# Patient Record
Sex: MALE | Race: WHITE | HISPANIC OR LATINO
[De-identification: names, ages, dates, MRNs, and addresses within clinical notes are randomized per-mention and may not be internally consistent; named-entity substitution may affect disease eponyms.]

---

## 2024-02-05 ENCOUNTER — APPOINTMENT (OUTPATIENT)
Dept: SURGERY | Facility: CLINIC | Age: 44
End: 2024-02-05
Payer: COMMERCIAL

## 2024-02-05 VITALS
SYSTOLIC BLOOD PRESSURE: 144 MMHG | BODY MASS INDEX: 29.8 KG/M2 | WEIGHT: 220 LBS | HEIGHT: 72 IN | DIASTOLIC BLOOD PRESSURE: 85 MMHG | HEART RATE: 85 BPM | OXYGEN SATURATION: 99 %

## 2024-02-05 DIAGNOSIS — Z78.9 OTHER SPECIFIED HEALTH STATUS: ICD-10-CM

## 2024-02-05 DIAGNOSIS — Z80.0 FAMILY HISTORY OF MALIGNANT NEOPLASM OF DIGESTIVE ORGANS: ICD-10-CM

## 2024-02-05 PROBLEM — Z00.00 ENCOUNTER FOR PREVENTIVE HEALTH EXAMINATION: Status: ACTIVE | Noted: 2024-02-05

## 2024-02-05 PROCEDURE — 99204 OFFICE O/P NEW MOD 45 MIN: CPT

## 2024-02-05 RX ORDER — LEVOTHYROXINE SODIUM 0.03 MG/1
25 TABLET ORAL
Refills: 0 | Status: ACTIVE | COMMUNITY

## 2024-02-05 NOTE — PLAN
[FreeTextEntry1] : Mr. LIGHT  was told significance of findings, options, risks and benefits were explained.  Informed consent for laparoscopic/possible open  cholecystectomy  and potential risks, benefits and alternatives (surgical options were discussed including non-surgical options or the option of no surgery) to the planned surgery were discussed in depth.  All surgical options were discussed including non-surgical treatments.  He wishes to proceed with surgery.  We will plan for surgery on at the next available date, pending any required insurance pre-certification or pre-approval. He agrees to obtain any necessary pre-operative evaluations and testing prior to surgery. Patient instructed to maintain a fat-free diet, and to seek immediate medical attention with any acute change or worsening of symptoms, including but not limited to abdominal pain, fever, chills, nausea, vomiting, or yellowing of the skin.  Patient advised to seek immediate medical attention with any acute change in symptoms or with the development of any new or worsening symptoms.  Patient's questions and concerns addressed to patient's satisfaction, and patient verbalized an understanding of the information discussed.

## 2024-02-05 NOTE — CONSULT LETTER
[Dear  ___] : Dear  [unfilled], [Consult Letter:] : I had the pleasure of evaluating your patient, [unfilled]. [Please see my note below.] : Please see my note below. [Consult Closing:] : Thank you very much for allowing me to participate in the care of this patient.  If you have any questions, please do not hesitate to contact me. [Sincerely,] : Sincerely, [FreeTextEntry3] : Catracho Loya MD, FACS

## 2024-02-05 NOTE — PHYSICAL EXAM
[Abdominal Masses] : No abdominal masses [Abdomen Tenderness] : ~T ~M No abdominal tenderness [Alert] : alert [Oriented to Person] : oriented to person [Oriented to Place] : oriented to place [Oriented to Time] : oriented to time [Calm] : calm [de-identified] : He  is alert, well-groomed, and in NAD   [de-identified] : anicteric.  Nasal mucosa pink, septum midline. Oral mucosa pink.  Tongue midline, Pharynx without exudates.   [de-identified] : Neck supple. Trachea midline. Thyroid isthmus barely palpable, lobes not felt.   [de-identified] : no hernia

## 2024-02-05 NOTE — HISTORY OF PRESENT ILLNESS
[de-identified] : Mr. JOSE ENRIQUE LIGHT is a 43 year  old patient who was referred by Dr. Kyle Loya with the chief complaint of having right upper quadrant and epigastric pain on and off   since January.    He reports admission to [facility] for severe abdominal pain nausea and vomiting  on  01/05/2024  . He was admitted  for 8 days for cholecystitis and discharged to home on PO Augmentin.   LFT and AST were elevated.  WBC were 12.4.  Lipase was normal.  Records were reviewed using patient portal using a patient's mobile device.US abdomen from 01/05/2024  showed 5.8 mm GB wall and multiple GB stones. Positive Shah sign. since his discharge  he had  no nausea or vomiting and no history of jaundice, acholia or acholuria.   Appetite is good and weight is stable.   He   has no family history of biliary tract disease.  
2 seconds or less

## 2024-02-07 ENCOUNTER — OUTPATIENT (OUTPATIENT)
Dept: OUTPATIENT SERVICES | Facility: HOSPITAL | Age: 44
LOS: 1 days | End: 2024-02-07
Payer: COMMERCIAL

## 2024-02-07 VITALS
TEMPERATURE: 98 F | HEIGHT: 72 IN | HEART RATE: 82 BPM | SYSTOLIC BLOOD PRESSURE: 122 MMHG | DIASTOLIC BLOOD PRESSURE: 82 MMHG | WEIGHT: 220.46 LBS | OXYGEN SATURATION: 98 % | RESPIRATION RATE: 18 BRPM

## 2024-02-07 DIAGNOSIS — K81.9 CHOLECYSTITIS, UNSPECIFIED: ICD-10-CM

## 2024-02-07 DIAGNOSIS — Z01.818 ENCOUNTER FOR OTHER PREPROCEDURAL EXAMINATION: ICD-10-CM

## 2024-02-07 DIAGNOSIS — E03.9 HYPOTHYROIDISM, UNSPECIFIED: ICD-10-CM

## 2024-02-07 DIAGNOSIS — Z98.890 OTHER SPECIFIED POSTPROCEDURAL STATES: Chronic | ICD-10-CM

## 2024-02-07 LAB
ALBUMIN SERPL ELPH-MCNC: 3.6 G/DL — SIGNIFICANT CHANGE UP (ref 3.5–5)
ALP SERPL-CCNC: 56 U/L — SIGNIFICANT CHANGE UP (ref 40–120)
ALT FLD-CCNC: 31 U/L DA — SIGNIFICANT CHANGE UP (ref 10–60)
ANION GAP SERPL CALC-SCNC: 5 MMOL/L — SIGNIFICANT CHANGE UP (ref 5–17)
APTT BLD: 33.5 SEC — SIGNIFICANT CHANGE UP (ref 24.5–35.6)
AST SERPL-CCNC: 24 U/L — SIGNIFICANT CHANGE UP (ref 10–40)
BILIRUB SERPL-MCNC: 0.3 MG/DL — SIGNIFICANT CHANGE UP (ref 0.2–1.2)
BLD GP AB SCN SERPL QL: SIGNIFICANT CHANGE UP
BUN SERPL-MCNC: 13 MG/DL — SIGNIFICANT CHANGE UP (ref 7–18)
CALCIUM SERPL-MCNC: 9.1 MG/DL — SIGNIFICANT CHANGE UP (ref 8.4–10.5)
CHLORIDE SERPL-SCNC: 104 MMOL/L — SIGNIFICANT CHANGE UP (ref 96–108)
CO2 SERPL-SCNC: 28 MMOL/L — SIGNIFICANT CHANGE UP (ref 22–31)
CREAT SERPL-MCNC: 1.03 MG/DL — SIGNIFICANT CHANGE UP (ref 0.5–1.3)
EGFR: 91 ML/MIN/1.73M2 — SIGNIFICANT CHANGE UP
GLUCOSE SERPL-MCNC: 96 MG/DL — SIGNIFICANT CHANGE UP (ref 70–99)
HCT VFR BLD CALC: 41.6 % — SIGNIFICANT CHANGE UP (ref 39–50)
HGB BLD-MCNC: 13.9 G/DL — SIGNIFICANT CHANGE UP (ref 13–17)
INR BLD: 1 RATIO — SIGNIFICANT CHANGE UP (ref 0.85–1.18)
MCHC RBC-ENTMCNC: 28.4 PG — SIGNIFICANT CHANGE UP (ref 27–34)
MCHC RBC-ENTMCNC: 33.4 GM/DL — SIGNIFICANT CHANGE UP (ref 32–36)
MCV RBC AUTO: 85.1 FL — SIGNIFICANT CHANGE UP (ref 80–100)
NRBC # BLD: 0 /100 WBCS — SIGNIFICANT CHANGE UP (ref 0–0)
PLATELET # BLD AUTO: 295 K/UL — SIGNIFICANT CHANGE UP (ref 150–400)
POTASSIUM SERPL-MCNC: 4.2 MMOL/L — SIGNIFICANT CHANGE UP (ref 3.5–5.3)
POTASSIUM SERPL-SCNC: 4.2 MMOL/L — SIGNIFICANT CHANGE UP (ref 3.5–5.3)
PROT SERPL-MCNC: 7.7 G/DL — SIGNIFICANT CHANGE UP (ref 6–8.3)
PROTHROM AB SERPL-ACNC: 11.4 SEC — SIGNIFICANT CHANGE UP (ref 9.5–13)
RBC # BLD: 4.89 M/UL — SIGNIFICANT CHANGE UP (ref 4.2–5.8)
RBC # FLD: 12.3 % — SIGNIFICANT CHANGE UP (ref 10.3–14.5)
SODIUM SERPL-SCNC: 137 MMOL/L — SIGNIFICANT CHANGE UP (ref 135–145)
T4 AB SER-ACNC: 9.9 UG/DL — SIGNIFICANT CHANGE UP (ref 4.6–12)
TSH SERPL-MCNC: 2.94 UU/ML — SIGNIFICANT CHANGE UP (ref 0.34–4.82)
WBC # BLD: 8.16 K/UL — SIGNIFICANT CHANGE UP (ref 3.8–10.5)
WBC # FLD AUTO: 8.16 K/UL — SIGNIFICANT CHANGE UP (ref 3.8–10.5)

## 2024-02-07 NOTE — H&P PST ADULT - NSICDXPROCEDURE_GEN_ALL_CORE_FT
PROCEDURES:  Laparoscopic cholecystectomy with cholangiography 07-Feb-2024 16:40:28  Melania Saenz

## 2024-02-07 NOTE — H&P PST ADULT - ASSESSMENT
This is a 43 yr old  male with PMH of Hypothyroidism on Levothyroxine, COVID-19 virus infection in 2022-fully recovered who presents with cholecystitis. Pt for laparoscopic cholecystectomy with intraoperative cholangiogram, possible open on 2/9/2024.  LEONIE stop bang score 1. Pt denies history of LEONIE, never did sleep study.

## 2024-02-07 NOTE — H&P PST ADULT - PROBLEM SELECTOR PLAN 1
Pt for laparoscopic cholecystectomy with intraoperative cholangiogram, possible open on 2/9/2024.  LEONIE stop bang score 1. Pt denies history of LEONIE, never did sleep study.   Preoperative instructions discussed with pt via Thai speaking [employer]  Mel[place] ID# [id number]. Kittitian copy of instructions given to pt.   Instructed pt that he will need someone to escort him home after surgery, to notify security when he arrives in the lobby of the hospital that he is here for surgery, not to eat or drink anything after midnight the night before the surgery, to avoid NSAIDs such as Ibuprofen, Motrin, aleve, Advil, naproxen before surgery, to take Tylenol if needed for pain, to report if he has been exposed to any one with any contagious diseases including Covid-19 or if he is exhibiting any symptoms of COVID-19.  Instructed about use of Chlorhexidine 4% soap 3 days before surgery including the morning of surgery. Verbalized understanding of instructions given.

## 2024-02-07 NOTE — H&P PST ADULT - HISTORY OF PRESENT ILLNESS
This is a yr old male with PMH of  presents with c/o intermittent abdominal pain due to gallstones. Pt reports feeling nauseous before meals and worsening of pain after ingestion of fatty meals. Pt for laparoscopic cholecystectomy with intraoperative cholangiogram, possible open on 2/9/2024.   This is a 43 yr old  male with PMH of Hypothyroidism on Levothyroxine, COVID-19 virus infection in 2022-fully recovered who presents with c/o intermittent abdominal pain due to gallstones. Pt for laparoscopic cholecystectomy with intraoperative cholangiogram, possible open on 2/9/2024.

## 2024-02-07 NOTE — H&P PST ADULT - GASTROINTESTINAL
normal/soft/nontender/nondistended/normal active bowel sounds details… normal/soft/nondistended/normal active bowel sounds/tender

## 2024-02-07 NOTE — H&P PST ADULT - NSICDXFAMILYHX_GEN_ALL_CORE_FT
FAMILY HISTORY:  Father  Still living? No  Family history of pancreatic cancer, Age at diagnosis: Age Unknown

## 2024-02-07 NOTE — H&P PST ADULT - NSICDXPASTMEDICALHX_GEN_ALL_CORE_FT
PAST MEDICAL HISTORY:  Hypothyroidism      PAST MEDICAL HISTORY:  Cholecystitis     Hypothyroidism

## 2024-02-07 NOTE — H&P PST ADULT - PROBLEM SELECTOR PLAN 2
Pt on Levothyroxine-last TFT unkown-will check with preop labs  Instructed to continue Levothyroxine and take it with sips of water on day of surgery.   Follow-up with provider for management.

## 2024-02-08 ENCOUNTER — TRANSCRIPTION ENCOUNTER (OUTPATIENT)
Age: 44
End: 2024-02-08

## 2024-02-08 PROCEDURE — G0463: CPT

## 2024-02-08 PROCEDURE — T1013: CPT

## 2024-02-08 PROCEDURE — 93005 ELECTROCARDIOGRAM TRACING: CPT

## 2024-02-09 ENCOUNTER — APPOINTMENT (OUTPATIENT)
Dept: SURGERY | Facility: HOSPITAL | Age: 44
End: 2024-02-09
Payer: COMMERCIAL

## 2024-02-09 ENCOUNTER — INPATIENT (INPATIENT)
Facility: HOSPITAL | Age: 44
LOS: 2 days | Discharge: HOME | DRG: 871 | End: 2024-02-12
Attending: INTERNAL MEDICINE | Admitting: INTERNAL MEDICINE
Payer: COMMERCIAL

## 2024-02-09 ENCOUNTER — TRANSCRIPTION ENCOUNTER (OUTPATIENT)
Age: 44
End: 2024-02-09

## 2024-02-09 ENCOUNTER — OUTPATIENT (OUTPATIENT)
Dept: OUTPATIENT SERVICES | Facility: HOSPITAL | Age: 44
LOS: 1 days | End: 2024-02-09
Payer: COMMERCIAL

## 2024-02-09 ENCOUNTER — RESULT REVIEW (OUTPATIENT)
Age: 44
End: 2024-02-09

## 2024-02-09 VITALS
OXYGEN SATURATION: 98 % | HEART RATE: 77 BPM | DIASTOLIC BLOOD PRESSURE: 79 MMHG | SYSTOLIC BLOOD PRESSURE: 124 MMHG | TEMPERATURE: 98 F | WEIGHT: 220.46 LBS | HEIGHT: 72 IN | RESPIRATION RATE: 18 BRPM

## 2024-02-09 VITALS
TEMPERATURE: 97 F | RESPIRATION RATE: 18 BRPM | WEIGHT: 199.96 LBS | HEIGHT: 72 IN | OXYGEN SATURATION: 95 % | DIASTOLIC BLOOD PRESSURE: 74 MMHG | HEART RATE: 118 BPM | SYSTOLIC BLOOD PRESSURE: 130 MMHG

## 2024-02-09 VITALS
DIASTOLIC BLOOD PRESSURE: 74 MMHG | RESPIRATION RATE: 16 BRPM | TEMPERATURE: 98 F | OXYGEN SATURATION: 98 % | SYSTOLIC BLOOD PRESSURE: 126 MMHG | HEART RATE: 71 BPM

## 2024-02-09 DIAGNOSIS — Z98.890 OTHER SPECIFIED POSTPROCEDURAL STATES: Chronic | ICD-10-CM

## 2024-02-09 DIAGNOSIS — K81.9 CHOLECYSTITIS, UNSPECIFIED: ICD-10-CM

## 2024-02-09 LAB — BLD GP AB SCN SERPL QL: SIGNIFICANT CHANGE UP

## 2024-02-09 PROCEDURE — 47562 LAPAROSCOPIC CHOLECYSTECTOMY: CPT

## 2024-02-09 PROCEDURE — 86901 BLOOD TYPING SEROLOGIC RH(D): CPT

## 2024-02-09 PROCEDURE — 88304 TISSUE EXAM BY PATHOLOGIST: CPT | Mod: 26

## 2024-02-09 PROCEDURE — 36415 COLL VENOUS BLD VENIPUNCTURE: CPT

## 2024-02-09 PROCEDURE — 47563 LAPARO CHOLECYSTECTOMY/GRAPH: CPT

## 2024-02-09 PROCEDURE — 99285 EMERGENCY DEPT VISIT HI MDM: CPT

## 2024-02-09 PROCEDURE — 88304 TISSUE EXAM BY PATHOLOGIST: CPT

## 2024-02-09 PROCEDURE — 86850 RBC ANTIBODY SCREEN: CPT

## 2024-02-09 PROCEDURE — 86900 BLOOD TYPING SEROLOGIC ABO: CPT

## 2024-02-09 PROCEDURE — 47562 LAPAROSCOPIC CHOLECYSTECTOMY: CPT | Mod: AS

## 2024-02-09 DEVICE — IMPLANTABLE DEVICE: Type: IMPLANTABLE DEVICE | Site: ABDOMINAL | Status: FUNCTIONAL

## 2024-02-09 DEVICE — CLIP APPLIER COVIDIEN ENDOCLIP II 10MM MED/LG: Type: IMPLANTABLE DEVICE | Site: ABDOMINAL | Status: FUNCTIONAL

## 2024-02-09 RX ORDER — SODIUM CHLORIDE 9 MG/ML
1000 INJECTION, SOLUTION INTRAVENOUS
Refills: 0 | Status: DISCONTINUED | OUTPATIENT
Start: 2024-02-09 | End: 2024-02-09

## 2024-02-09 RX ORDER — HYDROMORPHONE HYDROCHLORIDE 2 MG/ML
0.5 INJECTION INTRAMUSCULAR; INTRAVENOUS; SUBCUTANEOUS
Refills: 0 | Status: DISCONTINUED | OUTPATIENT
Start: 2024-02-09 | End: 2024-02-09

## 2024-02-09 RX ORDER — OXYCODONE HYDROCHLORIDE 5 MG/1
1 TABLET ORAL
Qty: 8 | Refills: 0
Start: 2024-02-09 | End: 2024-02-10

## 2024-02-09 RX ORDER — SODIUM CHLORIDE 9 MG/ML
3 INJECTION INTRAMUSCULAR; INTRAVENOUS; SUBCUTANEOUS EVERY 8 HOURS
Refills: 0 | Status: DISCONTINUED | OUTPATIENT
Start: 2024-02-09 | End: 2024-02-09

## 2024-02-09 RX ORDER — HYDROMORPHONE HYDROCHLORIDE 2 MG/ML
1 INJECTION INTRAMUSCULAR; INTRAVENOUS; SUBCUTANEOUS
Refills: 0 | Status: DISCONTINUED | OUTPATIENT
Start: 2024-02-09 | End: 2024-02-09

## 2024-02-09 RX ORDER — ONDANSETRON 8 MG/1
4 TABLET, FILM COATED ORAL ONCE
Refills: 0 | Status: DISCONTINUED | OUTPATIENT
Start: 2024-02-09 | End: 2024-02-09

## 2024-02-09 NOTE — ASU PATIENT PROFILE, ADULT - FALL HARM RISK - UNIVERSAL INTERVENTIONS
Bed in lowest position, wheels locked, appropriate side rails in place/Call bell, personal items and telephone in reach/Instruct patient to call for assistance before getting out of bed or chair/Non-slip footwear when patient is out of bed/[place] to call system/Physically safe environment - no spills, clutter or unnecessary equipment/Purposeful Proactive Rounding/Room/bathroom lighting operational, light cord in reach

## 2024-02-09 NOTE — ED ADULT TRIAGE NOTE - CHIEF COMPLAINT QUOTE
Pt stated he had laparoscopic cholecystectomy done today c/o stomach pain going to his back and it hurts when he sits down or breathes

## 2024-02-09 NOTE — ASU DISCHARGE PLAN (ADULT/PEDIATRIC) - NS MD DC FALL RISK RISK
For information on Fall & Injury Prevention, visit: [email or web address] OR  [email or web address] OR  https://www.cdc.gov/steadi/patient.html

## 2024-02-09 NOTE — ASU DISCHARGE PLAN (ADULT/PEDIATRIC) - CARE PROVIDER_API CALL
Catracho Loya  Surgery  [address]  [place] [address]  Phone: [phone]  Fax: [phone]  Follow Up Time:

## 2024-02-10 DIAGNOSIS — J69.0 PNEUMONITIS DUE TO INHALATION OF FOOD AND VOMIT: ICD-10-CM

## 2024-02-10 DIAGNOSIS — N17.9 ACUTE KIDNEY FAILURE, UNSPECIFIED: ICD-10-CM

## 2024-02-10 DIAGNOSIS — Z90.49 ACQUIRED ABSENCE OF OTHER SPECIFIED PARTS OF DIGESTIVE TRACT: ICD-10-CM

## 2024-02-10 DIAGNOSIS — E03.9 HYPOTHYROIDISM, UNSPECIFIED: ICD-10-CM

## 2024-02-10 DIAGNOSIS — Z29.9 ENCOUNTER FOR PROPHYLACTIC MEASURES, UNSPECIFIED: ICD-10-CM

## 2024-02-10 LAB
ALBUMIN SERPL ELPH-MCNC: 3.9 G/DL — SIGNIFICANT CHANGE UP (ref 3.5–5)
ALP SERPL-CCNC: 55 U/L — SIGNIFICANT CHANGE UP (ref 40–120)
ALT FLD-CCNC: 36 U/L DA — SIGNIFICANT CHANGE UP (ref 10–60)
ANION GAP SERPL CALC-SCNC: 8 MMOL/L — SIGNIFICANT CHANGE UP (ref 5–17)
APPEARANCE UR: CLEAR — SIGNIFICANT CHANGE UP
APTT BLD: 30.1 SEC — SIGNIFICANT CHANGE UP (ref 24.5–35.6)
AST SERPL-CCNC: 27 U/L — SIGNIFICANT CHANGE UP (ref 10–40)
BASOPHILS # BLD AUTO: 0.04 K/UL — SIGNIFICANT CHANGE UP (ref 0–0.2)
BASOPHILS NFR BLD AUTO: 0.2 % — SIGNIFICANT CHANGE UP (ref 0–2)
BILIRUB SERPL-MCNC: 0.7 MG/DL — SIGNIFICANT CHANGE UP (ref 0.2–1.2)
BILIRUB UR-MCNC: NEGATIVE — SIGNIFICANT CHANGE UP
BLD GP AB SCN SERPL QL: SIGNIFICANT CHANGE UP
BUN SERPL-MCNC: 17 MG/DL — SIGNIFICANT CHANGE UP (ref 7–18)
CALCIUM SERPL-MCNC: 9.1 MG/DL — SIGNIFICANT CHANGE UP (ref 8.4–10.5)
CHLORIDE SERPL-SCNC: 101 MMOL/L — SIGNIFICANT CHANGE UP (ref 96–108)
CO2 SERPL-SCNC: 25 MMOL/L — SIGNIFICANT CHANGE UP (ref 22–31)
COLOR SPEC: YELLOW — SIGNIFICANT CHANGE UP
CREAT SERPL-MCNC: 1.35 MG/DL — HIGH (ref 0.5–1.3)
DIFF PNL FLD: NEGATIVE — SIGNIFICANT CHANGE UP
EGFR: 66 ML/MIN/1.73M2 — SIGNIFICANT CHANGE UP
EOSINOPHIL # BLD AUTO: 0.03 K/UL — SIGNIFICANT CHANGE UP (ref 0–0.5)
EOSINOPHIL NFR BLD AUTO: 0.1 % — SIGNIFICANT CHANGE UP (ref 0–6)
GLUCOSE SERPL-MCNC: 171 MG/DL — HIGH (ref 70–99)
GLUCOSE UR QL: NEGATIVE MG/DL — SIGNIFICANT CHANGE UP
HCT VFR BLD CALC: 43.6 % — SIGNIFICANT CHANGE UP (ref 39–50)
HGB BLD-MCNC: 14.9 G/DL — SIGNIFICANT CHANGE UP (ref 13–17)
IMM GRANULOCYTES NFR BLD AUTO: 0.5 % — SIGNIFICANT CHANGE UP (ref 0–0.9)
INR BLD: 1.04 RATIO — SIGNIFICANT CHANGE UP (ref 0.85–1.18)
KETONES UR-MCNC: NEGATIVE MG/DL — SIGNIFICANT CHANGE UP
LACTATE SERPL-SCNC: 0.7 MMOL/L — SIGNIFICANT CHANGE UP (ref 0.7–2)
LEUKOCYTE ESTERASE UR-ACNC: NEGATIVE — SIGNIFICANT CHANGE UP
LIDOCAIN IGE QN: 28 U/L — SIGNIFICANT CHANGE UP (ref 13–75)
LYMPHOCYTES # BLD AUTO: 10.8 % — LOW (ref 13–44)
LYMPHOCYTES # BLD AUTO: 2.22 K/UL — SIGNIFICANT CHANGE UP (ref 1–3.3)
MAGNESIUM SERPL-MCNC: 1.9 MG/DL — SIGNIFICANT CHANGE UP (ref 1.6–2.6)
MAGNESIUM SERPL-MCNC: 2.3 MG/DL — SIGNIFICANT CHANGE UP (ref 1.6–2.6)
MCHC RBC-ENTMCNC: 28.5 PG — SIGNIFICANT CHANGE UP (ref 27–34)
MCHC RBC-ENTMCNC: 34.2 GM/DL — SIGNIFICANT CHANGE UP (ref 32–36)
MCV RBC AUTO: 83.4 FL — SIGNIFICANT CHANGE UP (ref 80–100)
MONOCYTES # BLD AUTO: 1.26 K/UL — HIGH (ref 0–0.9)
MONOCYTES NFR BLD AUTO: 6.1 % — SIGNIFICANT CHANGE UP (ref 2–14)
NEUTROPHILS # BLD AUTO: 16.86 K/UL — HIGH (ref 1.8–7.4)
NEUTROPHILS NFR BLD AUTO: 82.3 % — HIGH (ref 43–77)
NITRITE UR-MCNC: NEGATIVE — SIGNIFICANT CHANGE UP
NRBC # BLD: 0 /100 WBCS — SIGNIFICANT CHANGE UP (ref 0–0)
PH UR: 5.5 — SIGNIFICANT CHANGE UP (ref 5–8)
PHOSPHATE SERPL-MCNC: 3.8 MG/DL — SIGNIFICANT CHANGE UP (ref 2.5–4.5)
PHOSPHATE SERPL-MCNC: 4.1 MG/DL — SIGNIFICANT CHANGE UP (ref 2.5–4.5)
PLATELET # BLD AUTO: 361 K/UL — SIGNIFICANT CHANGE UP (ref 150–400)
POTASSIUM SERPL-MCNC: 4.4 MMOL/L — SIGNIFICANT CHANGE UP (ref 3.5–5.3)
POTASSIUM SERPL-SCNC: 4.4 MMOL/L — SIGNIFICANT CHANGE UP (ref 3.5–5.3)
PROT SERPL-MCNC: 8.6 G/DL — HIGH (ref 6–8.3)
PROT UR-MCNC: NEGATIVE MG/DL — SIGNIFICANT CHANGE UP
PROTHROM AB SERPL-ACNC: 11.8 SEC — SIGNIFICANT CHANGE UP (ref 9.5–13)
RAPID RVP RESULT: SIGNIFICANT CHANGE UP
RBC # BLD: 5.23 M/UL — SIGNIFICANT CHANGE UP (ref 4.2–5.8)
RBC # FLD: 12.5 % — SIGNIFICANT CHANGE UP (ref 10.3–14.5)
SARS-COV-2 RNA SPEC QL NAA+PROBE: SIGNIFICANT CHANGE UP
SODIUM SERPL-SCNC: 134 MMOL/L — LOW (ref 135–145)
SP GR SPEC: 1.03 — SIGNIFICANT CHANGE UP (ref 1–1.03)
UROBILINOGEN FLD QL: 0.2 MG/DL — SIGNIFICANT CHANGE UP (ref 0.2–1)
WBC # BLD: 20.04 K/UL — HIGH (ref 3.8–10.5)
WBC # FLD AUTO: 20.04 K/UL — HIGH (ref 3.8–10.5)

## 2024-02-10 PROCEDURE — 74177 CT ABD & PELVIS W/CONTRAST: CPT | Mod: 26,MA

## 2024-02-10 PROCEDURE — 99222 1ST HOSP IP/OBS MODERATE 55: CPT | Mod: GC

## 2024-02-10 RX ORDER — HYDROMORPHONE HYDROCHLORIDE 2 MG/ML
1 INJECTION INTRAMUSCULAR; INTRAVENOUS; SUBCUTANEOUS ONCE
Refills: 0 | Status: DISCONTINUED | OUTPATIENT
Start: 2024-02-10 | End: 2024-02-10

## 2024-02-10 RX ORDER — PIPERACILLIN AND TAZOBACTAM 4; .5 G/20ML; G/20ML
3.38 INJECTION, POWDER, LYOPHILIZED, FOR SOLUTION INTRAVENOUS EVERY 8 HOURS
Refills: 0 | Status: DISCONTINUED | OUTPATIENT
Start: 2024-02-10 | End: 2024-02-12

## 2024-02-10 RX ORDER — INFLUENZA VIRUS VACCINE 15; 15; 15; 15 UG/.5ML; UG/.5ML; UG/.5ML; UG/.5ML
0.5 SUSPENSION INTRAMUSCULAR ONCE
Refills: 0 | Status: DISCONTINUED | OUTPATIENT
Start: 2024-02-10 | End: 2024-02-12

## 2024-02-10 RX ORDER — SODIUM CHLORIDE 9 MG/ML
1000 INJECTION, SOLUTION INTRAVENOUS
Refills: 0 | Status: DISCONTINUED | OUTPATIENT
Start: 2024-02-10 | End: 2024-02-11

## 2024-02-10 RX ORDER — PIPERACILLIN AND TAZOBACTAM 4; .5 G/20ML; G/20ML
3.38 INJECTION, POWDER, LYOPHILIZED, FOR SOLUTION INTRAVENOUS ONCE
Refills: 0 | Status: COMPLETED | OUTPATIENT
Start: 2024-02-10 | End: 2024-02-10

## 2024-02-10 RX ORDER — SODIUM CHLORIDE 9 MG/ML
1000 INJECTION INTRAMUSCULAR; INTRAVENOUS; SUBCUTANEOUS ONCE
Refills: 0 | Status: COMPLETED | OUTPATIENT
Start: 2024-02-10 | End: 2024-02-10

## 2024-02-10 RX ORDER — ONDANSETRON 8 MG/1
4 TABLET, FILM COATED ORAL EVERY 8 HOURS
Refills: 0 | Status: DISCONTINUED | OUTPATIENT
Start: 2024-02-10 | End: 2024-02-12

## 2024-02-10 RX ORDER — MORPHINE SULFATE 50 MG/1
4 CAPSULE, EXTENDED RELEASE ORAL ONCE
Refills: 0 | Status: DISCONTINUED | OUTPATIENT
Start: 2024-02-10 | End: 2024-02-10

## 2024-02-10 RX ORDER — ACETAMINOPHEN 500 MG
650 TABLET ORAL EVERY 6 HOURS
Refills: 0 | Status: DISCONTINUED | OUTPATIENT
Start: 2024-02-10 | End: 2024-02-12

## 2024-02-10 RX ORDER — HYDROMORPHONE HYDROCHLORIDE 2 MG/ML
0.5 INJECTION INTRAMUSCULAR; INTRAVENOUS; SUBCUTANEOUS ONCE
Refills: 0 | Status: DISCONTINUED | OUTPATIENT
Start: 2024-02-10 | End: 2024-02-10

## 2024-02-10 RX ORDER — LEVOTHYROXINE SODIUM 125 MCG
1 TABLET ORAL
Refills: 0 | DISCHARGE

## 2024-02-10 RX ORDER — ENOXAPARIN SODIUM 100 MG/ML
40 INJECTION SUBCUTANEOUS EVERY 24 HOURS
Refills: 0 | Status: DISCONTINUED | OUTPATIENT
Start: 2024-02-10 | End: 2024-02-12

## 2024-02-10 RX ORDER — LEVOTHYROXINE SODIUM 125 MCG
25 TABLET ORAL DAILY
Refills: 0 | Status: DISCONTINUED | OUTPATIENT
Start: 2024-02-10 | End: 2024-02-12

## 2024-02-10 RX ADMIN — PIPERACILLIN AND TAZOBACTAM 25 GRAM(S): 4; .5 INJECTION, POWDER, LYOPHILIZED, FOR SOLUTION INTRAVENOUS at 21:51

## 2024-02-10 RX ADMIN — Medication 650 MILLIGRAM(S): at 22:50

## 2024-02-10 RX ADMIN — SODIUM CHLORIDE 100 MILLILITER(S): 9 INJECTION, SOLUTION INTRAVENOUS at 15:36

## 2024-02-10 RX ADMIN — HYDROMORPHONE HYDROCHLORIDE 1 MILLIGRAM(S): 2 INJECTION INTRAMUSCULAR; INTRAVENOUS; SUBCUTANEOUS at 03:27

## 2024-02-10 RX ADMIN — PIPERACILLIN AND TAZOBACTAM 200 GRAM(S): 4; .5 INJECTION, POWDER, LYOPHILIZED, FOR SOLUTION INTRAVENOUS at 01:26

## 2024-02-10 RX ADMIN — Medication 650 MILLIGRAM(S): at 21:50

## 2024-02-10 RX ADMIN — Medication 25 MICROGRAM(S): at 07:35

## 2024-02-10 RX ADMIN — HYDROMORPHONE HYDROCHLORIDE 0.5 MILLIGRAM(S): 2 INJECTION INTRAMUSCULAR; INTRAVENOUS; SUBCUTANEOUS at 01:26

## 2024-02-10 RX ADMIN — SODIUM CHLORIDE 1000 MILLILITER(S): 9 INJECTION INTRAMUSCULAR; INTRAVENOUS; SUBCUTANEOUS at 01:26

## 2024-02-10 RX ADMIN — PIPERACILLIN AND TAZOBACTAM 25 GRAM(S): 4; .5 INJECTION, POWDER, LYOPHILIZED, FOR SOLUTION INTRAVENOUS at 15:36

## 2024-02-10 NOTE — H&P ADULT - PROBLEM SELECTOR PLAN 1
p/w dyspnea, n/v at home   CT Chest shows- aspiration pnu   Start azithromycin 500mg qd + rocephin 1g qd  Send strep ag, legionella, RVP, procalcitonin, mycoplasma igm  tylenol prn  O2 as needed   aspiration precautions p/w dyspnea, n/v at home   CT Chest shows- aspiration pnu   Start azithromycin 500mg qd + rocephin 1g qd  Send strep ag, legionella, RVP, procalcitonin, mycoplasma igm  tylenol prn  O2 as needed   aspiration precautions  f/u blood Cultures

## 2024-02-10 NOTE — H&P ADULT - ATTENDING COMMENTS
IMAGING  CT A/P with IV Contrast  IMPRESSION:  Bibasilar opacities, suspicious for pneumonia. Given the distribution, aspiration should be considered.  Postsurgical changes compatible with recent cholecystectomy.  Distended urinary bladder likely related to voluntary holding the possibility of urinary retention is raised. Correlate clinically.    EKG  Normal Sinus Rhythm, 69 bpm, QTc 439ms, NC 154ms, on my read no ST segment elevation or depression, no TWI    HPI  43 year old male patient with pmhx hypothyrodisim, s/p lap myra 2/9/24 for acute cholecystitis who presented to the ER due to RUQ abdominal pain radiating to the back.  The patient has some shortness of breath when taking a breath but no cough. CT A/P suggestive of pneumonia.    Review Of Systems included: + shortness of breath when taking a breath, + RUQ abdominal pain, + right shoulder pain,   no cough, no sore throat, no chest pain, no palpitations, no fever/chills, no nausea, no diarrhea, no constipation, no dysuria    Physical Exam  General: Awake, Alert, Oriented  Cardiac: RRR  Pulmonary: Crackles at bases  Abdominal: Soft, ND, + RUQ Abdominal Tenderness  Extremities: No edema, both legs same size    A/P  # Aspiration PNA  # RUQ Abdominal Pain  Abdominal pain possibly from recent lap myra and exacerbated by pneumonia in its proximity  WBC count of 20k  - IV Zosyn  - Can check Legionella/Strep Antigen  - Tylenol prn for pain/fever    # EMILY   Creatinine of 1.35, was 1.03 on 2/7/24  - IV Fluid Hydration    # Hx of Hypothyroidism  - Continue home medication Synthroid    # DVT PPx  - Lovenox    # FEN  - Monitor and replete electrolytes as needed  - IV Fluid Hydration    Previous Admissions Included  2/9/2024: s/p lap myra for acute cholecystitis    Patient case and management was discussed with ER Attending  I did examine all labs and imaging

## 2024-02-10 NOTE — H&P ADULT - PROBLEM SELECTOR PLAN 2
Ilana Nian is considered much less allergenic and is better tolerated by most babies over goats milk formula c/w home meds

## 2024-02-10 NOTE — CONSULT NOTE ADULT - ASSESSMENT
43 y.o. M with PMHx of hypothyroidism presents to the ED with back pain,chest pain with deep inspiration,s/p lap myra with pneumonia.  1.Pneumonia-abx.  2.CP may be pleuritric due to pneumonia-Echocardiogram.  3.Hypothyroid-synthroid.  4.GI and DVT prophylaxis.

## 2024-02-10 NOTE — CONSULT NOTE ADULT - SUBJECTIVE AND OBJECTIVE BOX
Date of Service  02-10-24 @ 12:59    CHIEF COMPLAINT:Patient is a 43y old  Male who presents with a chief complaint of Back pain (10 Feb 2024 11:28)      HPI:  43 y.o. M with PMHx of hypothyroidism presents to the ED with back pain. Pt states his symptoms started this morning where he started not feeling well and developed back pain, sharp in nature, radiated to the abdomen. Denies any new foods or doing anything unusual. Pt is s/p myra in [facility] yesterday with Dr. Jay. Otherwise pt denies any chest pain, palpitations, shortness of breath, fever, chills, headache, visual changes, nausea, vomiting, diarrhea, dysuria, frequency. NKDA    In the ED   VSS afebrile , sating well on RA  WBC 20k   CT a/p- Bibasilar opacities, suspicious for pneumonia. Given the distribution,   aspiration should be considered.  s/p zosyn, IVF   (10 Feb 2024 05:47)      PAST MEDICAL & SURGICAL HISTORY:  Hypothyroidism      Cholecystitis      S/P LASIK surgery of both eyes          MEDICATIONS  (STANDING):  enoxaparin Injectable 40 milliGRAM(s) SubCutaneous every 24 hours  lactated ringers. 1000 milliLiter(s) (100 mL/Hr) IV Continuous <Continuous>  levothyroxine 25 MICROGram(s) Oral daily  piperacillin/tazobactam IVPB.. 3.375 Gram(s) IV Intermittent every 8 hours    MEDICATIONS  (PRN):  acetaminophen     Tablet .. 650 milliGRAM(s) Oral every 6 hours PRN Temp greater or equal to 38C (100.4F), Mild Pain (1 - 3)  ondansetron Injectable 4 milliGRAM(s) IV Push every 8 hours PRN Nausea and/or Vomiting      FAMILY HISTORY:  Family history of pancreatic cancer (Father)        SOCIAL HISTORY:    [ ] Non-smoker  [ ] Smoker  [ ] Alcohol    Allergies    No Known Allergies    Intolerances    	    REVIEW OF SYSTEMS:  CONSTITUTIONAL: No fever, weight loss, or fatigue  EYES: No eye pain, visual disturbances, or discharge  ENT:  No difficulty hearing, tinnitus, vertigo; No sinus or throat pain  NECK: No pain or stiffness  RESPIRATORY: No cough, wheezing, chills or hemoptysis; No Shortness of Breath  CARDIOVASCULAR: No chest pain, palpitations, passing out, dizziness, or leg swelling  GASTROINTESTINAL: No abdominal or epigastric pain. No nausea, vomiting, or hematemesis; No diarrhea or constipation. No melena or hematochezia.  GENITOURINARY: No dysuria, frequency, hematuria, or incontinence  NEUROLOGICAL: No headaches, memory loss, loss of strength, numbness, or tremors  SKIN: No itching, burning, rashes, or lesions   LYMPH Nodes: No enlarged glands  ENDOCRINE: No heat or cold intolerance; No hair loss  MUSCULOSKELETAL: No joint pain or swelling; No muscle, back, or extremity pain  PSYCHIATRIC: No depression, anxiety, mood swings, or difficulty sleeping  HEME/LYMPH: No easy bruising, or bleeding gums  ALLERGY AND IMMUNOLOGIC: No hives or eczema	    [ ] All others negative	  [ ] Unable to obtain    PHYSICAL EXAM:  T(C): 36.7 (02-10-24 @ 08:07), Max: 36.8 (02-10-24 @ 05:16)  HR: 95 (02-10-24 @ 08:07) (71 - 115)  BP: 135/95 (02-10-24 @ 08:07) (120/77 - 135/95)  RR: 18 (02-10-24 @ 08:07) (16 - 18)  SpO2: 94% (02-10-24 @ 08:07) (94% - 98%)  Wt(kg): --  I&O's Summary      Appearance: Normal	  HEENT:   Normal oral mucosa, PERRL, EOMI	  Lymphatic: No lymphadenopathy  Cardiovascular: Normal S1 S2, No JVD, No murmurs, No edema  Respiratory: Lungs clear to auscultation	  Psychiatry: A & O x 3, Mood & affect appropriate  Gastrointestinal:  Soft, Non-tender, + BS	  Skin: No rashes, No ecchymoses, No cyanosis	  Neurologic: Non-focal  Extremities: Normal range of motion, No clubbing, cyanosis or edema  Vascular: Peripheral pulses palpable 2+ bilaterally    TELEMETRY: 	    ECG:  	  RADIOLOGY:  OTHER: 	  	  LABS:	 	    CARDIAC MARKERS:                              14.9   20.04 )-----------( 361      ( 10 Feb 2024 00:20 )             43.6     02-10    134<L>  |  101  |  17  ----------------------------<  171<H>  4.4   |  25  |  1.35<H>    Ca    9.1      10 Feb 2024 00:20  Phos  4.1     02-10  Mg     2.3     02-10    TPro  8.6<H>  /  Alb  3.9  /  TBili  0.7  /  DBili  x   /  AST  27  /  ALT  36  /  AlkPhos  55  02-10    proBNP:   Lipid Profile:   HgA1c:   TSH: Thyroid Stimulating Hormone, Serum: 1.63 uU/mL (02-10 @ 00:20)      PREVIOUS DIAGNOSTIC TESTING:    [ ] Echocardiogram:  [ ]  Catheterization:  [ ] Stress Test:         Date of Service  02-10-24 @ 12:59    CHIEF COMPLAINT:Patient is a 43y old  Male who presents with a chief complaint of Back pain .      HPI:  43 y.o. M with PMHx of hypothyroidism presents to the ED with back pain,chest pain with deep inspiration.. Pt states his symptoms started this morning where he started not feeling well and developed back pain, sharp in nature, radiated to the abdomen. Denies any new foods or doing anything unusual. Pt is s/p myra in [facility] yesterday with Dr. Jay. + chest pain on deep inspiration. Denies palpitations, shortness of breath, fever, chills, headache, visual changes, nausea, vomiting, diarrhea, dysuria, frequency. NKDA    In the ED   VSS afebrile , sating well on RA  WBC 20k   CT a/p- Bibasilar opacities, suspicious for pneumonia. Given the distribution,   aspiration should be considered.  s/p zosyn, IVF   (10 Feb 2024 05:47)      PAST MEDICAL & SURGICAL HISTORY:  Hypothyroidism      Cholecystitis      S/P LASIK surgery of both eyes          MEDICATIONS  (STANDING):  enoxaparin Injectable 40 milliGRAM(s) SubCutaneous every 24 hours  lactated ringers. 1000 milliLiter(s) (100 mL/Hr) IV Continuous <Continuous>  levothyroxine 25 MICROGram(s) Oral daily  piperacillin/tazobactam IVPB.. 3.375 Gram(s) IV Intermittent every 8 hours    MEDICATIONS  (PRN):  acetaminophen     Tablet .. 650 milliGRAM(s) Oral every 6 hours PRN Temp greater or equal to 38C (100.4F), Mild Pain (1 - 3)  ondansetron Injectable 4 milliGRAM(s) IV Push every 8 hours PRN Nausea and/or Vomiting      FAMILY HISTORY:  Family history of pancreatic cancer (Father)        SOCIAL HISTORY:    [x ] Non-smoker    [x ] Alcohol-denies    Allergies    No Known Allergies    Intolerances    	    REVIEW OF SYSTEMS:  CONSTITUTIONAL: No fever, weight loss, or fatigue  EYES: No eye pain, visual disturbances, or discharge  ENT:  No difficulty hearing, tinnitus, vertigo; No sinus or throat pain  NECK: No pain or stiffness  RESPIRATORY: No cough, wheezing, chills or hemoptysis; No Shortness of Breath  CARDIOVASCULAR: + chest pain,No palpitations, passing out, dizziness, or leg swelling  GASTROINTESTINAL: No abdominal or epigastric pain. No nausea, vomiting, or hematemesis; No diarrhea or constipation. No melena or hematochezia.  GENITOURINARY: No dysuria, frequency, hematuria, or incontinence  NEUROLOGICAL: No headaches, memory loss, loss of strength, numbness, or tremors  SKIN: No itching, burning, rashes, or lesions   LYMPH Nodes: No enlarged glands  ENDOCRINE: No heat or cold intolerance; No hair loss  MUSCULOSKELETAL: No joint pain or swelling; No muscle, back, or extremity pain  PSYCHIATRIC: No depression, anxiety, mood swings, or difficulty sleeping  HEME/LYMPH: No easy bruising, or bleeding gums  ALLERGY AND IMMUNOLOGIC: No hives or eczema	        PHYSICAL EXAM:  T(C): 36.7 (02-10-24 @ 08:07), Max: 36.8 (02-10-24 @ 05:16)  HR: 95 (02-10-24 @ 08:07) (71 - 115)  BP: 135/95 (02-10-24 @ 08:07) (120/77 - 135/95)  RR: 18 (02-10-24 @ 08:07) (16 - 18)  SpO2: 94% (02-10-24 @ 08:07) (94% - 98%)  Wt(kg): --  I&O's Summary      Appearance: Normal	  HEENT:   Normal oral mucosa, PERRL, EOMI	  Lymphatic: No lymphadenopathy  Cardiovascular: Normal S1 S2, No JVD, No murmurs, No edema  Respiratory: Dec BS at bases  Psychiatry: A & O x 3, Mood & affect appropriate  Gastrointestinal:  Soft, Non-tender, + BS	  Skin: No rashes, No ecchymoses, No cyanosis	  Neurologic: Non-focal  Extremities: Normal range of motion, No clubbing, cyanosis or edema  Vascular: Peripheral pulses palpable 2+ bilaterally      LABS:	 	                        14.9   20.04 )-----------( 361      ( 10 Feb 2024 00:20 )             43.6     02-10    134<L>  |  101  |  17  ----------------------------<  171<H>  4.4   |  25  |  1.35<H>    Ca    9.1      10 Feb 2024 00:20  Phos  4.1     02-10  Mg     2.3     02-10    TPro  8.6<H>  /  Alb  3.9  /  TBili  0.7  /  DBili  x   /  AST  27  /  ALT  36  /  AlkPhos  55  02-10    proBNP:   Lipid Profile:   HgA1c:   TSH: Thyroid Stimulating Hormone, Serum: 1.63 uU/mL (02-10 @ 00:20)      < from: CT Abdomen and Pelvis w/ IV Cont (02.10.24 @ 03:27) >  ACC: [id number] EXAM:  CT ABDOMEN AND PELVIS IC   ORDERED BY: FREYA SAINZ     PROCEDURE DATE:  02/10/2024          INTERPRETATION:  CLINICAL INFORMATION: Post cholecystectomy this morning   with severe pain of the shoulders worsening upon breathing.    COMPARISON: None.    CONTRAST/COMPLICATIONS:  IV Contrast: Omnipaque 350  90 cc administered   10 cc discarded  Oral Contrast: NONE  Complications: None reported at time of study completion    PROCEDURE:  CT of the Abdomen and Pelvis was performed.  Sagittal and coronal reformats were performed.    FINDINGS:  LOWER CHEST: Bilateral lower lobe consolidations with associated air   bronchograms on the right. While there may be some atelectasis   contributing to this appearance, findings are suspicious for bibasilar   pneumonia. Given the distribution, aspiration should be considered.    LIVER: Within normal limits.  BILE DUCTS: Normal caliber.  GALLBLADDER: Cholecystomy changes are appreciated. Small amount of fluid   is seen within the cholecystomy bed. Free air seen in the perihepatic   region and along the anterior abdomen is compatible with recent surgery.  SPLEEN: Within normal limits.  PANCREAS: Within normal limits.  ADRENALS: Within normal limits.  KIDNEYS/URETERS: Focal scarring of the right upper pole kidney. There is   no urinary tract obstruction.    BLADDER: Distended  REPRODUCTIVE ORGANS: Mild prostate enlargement..    BOWEL: No bowel obstruction. Appendix is normal.  PERITONEUM: No ascites. Free intraperitoneal airas above, compatible   with recent surgery.  VESSELS: Within normal limits.  RETROPERITONEUM/LYMPH NODES: No lymphadenopathy.  ABDOMINAL WALL: Postsurgical changes. Foci of air seen along the right   rectus sheath compatible with recent surgery.  BONES: Anterior wedging of lower thoracic vertebral bodies and   degenerative changes..    IMPRESSION:  Bibasilar opacities, suspicious for pneumonia. Given the distribution,   aspiration should be considered.    Postsurgical changes compatible with recent cholecystectomy.    Distended urinary bladder likely related to voluntary holding the   possibility of urinary retention is raised. Correlate clinically.    < end of copied text >

## 2024-02-10 NOTE — H&P ADULT - ASSESSMENT
43 y.o. M with PMHx of hypothyroidism presents to the ED with dyspnea and vomiting. Admitted to medicine for aspiration pneumonia.  43 y.o. M with PMHx of hypothyroidism presents to the ED with back pain. Admitted to medicine for aspiration pneumonia.

## 2024-02-10 NOTE — CONSULT NOTE ADULT - PROBLEM SELECTOR RECOMMENDATION 9
R/o atelectasis  Panculture  Antibiotics  Incentive Spirometry  Bronchodilators  Chest PT  Monitor Temp and WBC  ID consult  DVT PPX  CT Chest without contrast

## 2024-02-10 NOTE — ED PROVIDER NOTE - PHYSICAL EXAMINATION
General: ill appearing, lightheaded, slow to walk, asisted by wheelcair into ED, moderate distress, appears stated age  HEENT: normocephalic, atraumatic   Respiratory: normal work of breathing  MSK: no swelling or tenderness of lower extremities, moving all extremities spontaneously   Skin: warm, dry  Neuro: A&Ox3, cranial nerves II-XII intact, 5/5 strength in all extremities, no sensory deficits, normal gait   Psych: appropriate affect  ABD: soft, mild tender, nondistended, no guarding, no rebound, no CVA tenderness

## 2024-02-10 NOTE — ED PROVIDER NOTE - CARE PLAN
Principal Discharge DX:	Pneumonia, aspiration  Secondary Diagnosis:	Sepsis  Secondary Diagnosis:	S/P cholecystectomy   1

## 2024-02-10 NOTE — ED ADULT NURSE REASSESSMENT NOTE - NS ED NURSE REASSESS COMMENT FT1
pt bladder scanned prior to going to room due to insufficient urine sample. Scan showed small amount of urine in bladder 30cc, however sample for lab analysis was insufficient. MD made aware.

## 2024-02-10 NOTE — ED ADULT NURSE NOTE - OBJECTIVE STATEMENT
pt presents s/p myra with back pain radiating to the abdominal area. patient denies blood in urine, difficulty urination/defecating, SOB, CP or any other discomforts. At baseline, pt is AAOx4, speaking in clear and complete sentences, self-ambulatory. IV R AC#20

## 2024-02-10 NOTE — H&P ADULT - NSHPPHYSICALEXAM_GEN_ALL_CORE
VITALS:     GENERAL: NAD, lying in bed comfortably  HEAD:  Atraumatic, Normocephalic  EYES: EOMI, PERRLA, conjunctiva and sclera clear  ENT: Moist mucous membranes  NECK: Supple, No JVD  CHEST/LUNG: Clear to auscultation bilaterally; No rales, rhonchi, wheezing, or rubs. Unlabored respirations  HEART: Regular rate and rhythm; No murmurs, rubs, or gallops  ABDOMEN: Bowel sounds present; Soft, Nontender, Nondistended. No hepatomegally  EXTREMITIES:  2+ Peripheral Pulses, brisk capillary refill. No clubbing, cyanosis, or edema  NERVOUS SYSTEM:  Alert & Oriented X3, speech clear. No deficits   MSK: FROM all 4 extremities, full and equal strength  SKIN: No rashes or lesions

## 2024-02-10 NOTE — ED ADULT NURSE NOTE - NS ED NURSE RECORD ANOTHER VITAL SIGN
Ashli Moscoso's goals for this visit include: Return  She requests these members of her care team be copied on today's visit information:     PCP: Gauri Bolton    Referring Provider:  No referring provider defined for this encounter.    /70 (BP Location: Left arm, Patient Position: Sitting, Cuff Size: Adult Regular)  Pulse 89  Temp 98.6  F (37  C) (Oral)  Resp 16  Wt 65.3 kg (143 lb 14.4 oz)  SpO2 100%  BMI 23.95 kg/m2    Do you need any medication refills at today's visit? JOURDAN Dudley on 10/24/2018 at 2:53 PM      
Yes

## 2024-02-10 NOTE — CONSULT NOTE ADULT - SUBJECTIVE AND OBJECTIVE BOX
PULMONARY CONSULT NOTE      JOSE ENRIQUE PACHECO  MRN-[id number]    History of Present Illness:  History of Present Illness:   43 y.o. M with PMHx of hypothyroidism presents to the ED with back pain. Pt states his symptoms started this morning where he started not feeling well and developed back pain, sharp in nature, radiated to the abdomen. Denies any new foods or doing anything unusual. Pt is s/p myra in [facility] yesterday with Dr. Jay. Otherwise pt denies any chest pain, palpitations, shortness of breath, fever, chills, headache, visual changes, nausea, vomiting, diarrhea, dysuria, frequency. NKDA    In the ED   VSS afebrile , sating well on RA  WBC 20k   CT a/p- Bibasilar opacities, suspicious for pneumonia. Given the distribution,   aspiration should be considered.  s/p zosyn, IVF  HISTORY OF PRESENT ILLNESS: As above. Awake, Alert, lying in bed in NAD.    MEDICATIONS  (STANDING):  enoxaparin Injectable 40 milliGRAM(s) SubCutaneous every 24 hours  lactated ringers. 1000 milliLiter(s) (100 mL/Hr) IV Continuous <Continuous>  levothyroxine 25 MICROGram(s) Oral daily  piperacillin/tazobactam IVPB.. 3.375 Gram(s) IV Intermittent every 8 hours      MEDICATIONS  (PRN):  acetaminophen     Tablet .. 650 milliGRAM(s) Oral every 6 hours PRN Temp greater or equal to 38C (100.4F), Mild Pain (1 - 3)  ondansetron Injectable 4 milliGRAM(s) IV Push every 8 hours PRN Nausea and/or Vomiting      Allergies    No Known Allergies    Intolerances        PAST MEDICAL & SURGICAL HISTORY:  Hypothyroidism      Cholecystitis      S/P LASIK surgery of both eyes          FAMILY HISTORY:  Family history of pancreatic cancer (Father)        SOCIAL HISTORY  Smoking History:     REVIEW OF SYSTEMS:    CONSTITUTIONAL:  No fevers, chills, sweats    HEENT:  Eyes:  No diplopia or blurred vision. ENT:  No earache, sore throat or runny nose.    CARDIOVASCULAR:  No pressure, squeezing, tightness, or heaviness about the chest; no palpitations.    RESPIRATORY:  Per HPI    GASTROINTESTINAL:  No abdominal pain, nausea, vomiting or diarrhea.    GENITOURINARY:  No dysuria, frequency or urgency.    NEUROLOGIC:  No paresthesias, fasciculations, seizures or weakness.    PSYCHIATRIC:  No disorder of thought or mood.    Vital Signs Last 24 Hrs  T(C): 36.7 (10 Feb 2024 08:07), Max: 36.8 (10 Feb 2024 05:16)  T(F): 98 (10 Feb 2024 08:07), Max: 98.3 (10 Feb 2024 05:16)  HR: 95 (10 Feb 2024 08:07) (65 - 115)  BP: 135/95 (10 Feb 2024 08:07) (120/77 - 143/94)  BP(mean): 98 (09 Feb 2024 14:15) (98 - 110)  RR: 18 (10 Feb 2024 08:07) (16 - 22)  SpO2: 94% (10 Feb 2024 08:07) (94% - 100%)    Parameters below as of 10 Feb 2024 08:07  Patient On (Oxygen Delivery Method): room air      I&O's Detail      PHYSICAL EXAMINATION:    GENERAL: The patient is a well-developed, well-nourished _____in no apparent distress.     HEENT: Head is normocephalic and atraumatic. Extraocular muscles are intact. Mucous membranes are moist.     NECK: Supple.     LUNGS: Clear to auscultation without wheezing, rales, or rhonchi. Respirations unlabored    HEART: Regular rate and rhythm without murmur.    ABDOMEN: Soft, Incisional tenderness, and nondistended.  No hepatosplenomegaly is noted.    EXTREMITIES: Without any cyanosis, clubbing, rash, lesions or edema.    NEUROLOGIC: Grossly intact.      LABS:                        14.9   20.04 )-----------( 361      ( 10 Feb 2024 00:20 )             43.6     02-10    134<L>  |  101  |  17  ----------------------------<  171<H>  4.4   |  25  |  1.35<H>    Ca    9.1      10 Feb 2024 00:20  Phos  4.1     02-10  Mg     2.3     02-10    TPro  8.6<H>  /  Alb  3.9  /  TBili  0.7  /  DBili  x   /  AST  27  /  ALT  36  /  AlkPhos  55  02-10    PT/INR - ( 10 Feb 2024 00:20 )   PT: 11.8 sec;   INR: 1.04 ratio         PTT - ( 10 Feb 2024 00:20 )  PTT:30.1 sec  Urinalysis Basic - ( 10 Feb 2024 00:20 )    Color: x / Appearance: x / SG: x / pH: x  Gluc: 171 mg/dL / Ketone: x  / Bili: x / Urobili: x   Blood: x / Protein: x / Nitrite: x   Leuk Esterase: x / RBC: x / WBC x   Sq Epi: x / Non Sq Epi: x / Bacteria: x                Lactate, Blood: 0.7 mmol/L (02-10-24 @ 09:37)        MICROBIOLOGY:    RADIOLOGY & ADDITIONAL STUDIES:    CXR:  < from: CT Abdomen and Pelvis w/ IV Cont (02.10.24 @ 03:27) >  IMPRESSION:  Bibasilar opacities, suspicious for pneumonia. Given the distribution,   aspiration should be considered.    Postsurgical changes compatible with recent cholecystectomy.    Distended urinary bladder likely related to voluntary holding the   possibility of urinary retention is raised. Correlate clinically.    < end of copied text >    Ct scan chest;    ekg;    echo:

## 2024-02-10 NOTE — H&P ADULT - NSHPREVIEWOFSYSTEMS_GEN_ALL_CORE
REVIEW OF SYSTEMS:    CONSTITUTIONAL: No weakness, fevers or chills  EYES/ENT: No visual changes;  No vertigo or throat pain   NECK: No pain or stiffness  RESPIRATORY: No cough, wheezing, hemoptysis; No shortness of breath  CARDIOVASCULAR: No chest pain or palpitations  GASTROINTESTINAL: No abdominal or epigastric pain. + nausea, vomiting, or hematemesis; No diarrhea or constipation. No melena or hematochezia.  GENITOURINARY: No dysuria, frequency or hematuria  NEUROLOGICAL: No numbness or weakness  SKIN: No itching, burning, rashes, or lesions   All other review of systems is negative unless indicated above.

## 2024-02-10 NOTE — ED PROVIDER NOTE - OBJECTIVE STATEMENT
43-year-old male with a cholecystectomy earlier today presents with severe abdominal pain and back pain.  Patient states Pain is worse when he lies flat and takes a deep breath.  He denies fevers chills coughing sputum production.

## 2024-02-10 NOTE — PROGRESS NOTE ADULT - SUBJECTIVE AND OBJECTIVE BOX
INTERVAL HPI/OVERNIGHT EVENTS:  Pt stable.   Tolerating diet.   Ambulating  C/O right back pain    Vital Signs Last 24 Hrs  T(C): 36.7 (10 Feb 2024 13:27), Max: 36.8 (10 Feb 2024 05:16)  T(F): 98 (10 Feb 2024 13:27), Max: 98.3 (10 Feb 2024 05:16)  HR: 89 (10 Feb 2024 13:27) (89 - 115)  BP: 129/78 (10 Feb 2024 13:27) (120/77 - 135/95)  BP(mean): --  RR: 18 (10 Feb 2024 13:27) (18 - 18)  SpO2: 94% (10 Feb 2024 13:27) (94% - 95%)    Parameters below as of 10 Feb 2024 08:07  Patient On (Oxygen Delivery Method): room air        Physical:  Abdomen: Soft nondistended, nontender.  Port sites clean.  Liver enzymes normal.    I&O's Summary    10 Feb 2024 07:01  -  10 Feb 2024 15:45  --------------------------------------------------------  IN: 0 mL / OUT: 200 mL / NET: -200 mL        LABS:                        14.9   20.04 )-----------( 361      ( 10 Feb 2024 00:20 )             43.6             02-10    134<L>  |  101  |  17  ----------------------------<  171<H>  4.4   |  25  |  1.35<H>    Ca    9.1      10 Feb 2024 00:20  Phos  4.1     02-10  Mg     2.3     02-10    TPro  8.6<H>  /  Alb  3.9  /  TBili  0.7  /  DBili  x   /  AST  27  /  ALT  36  /  AlkPhos  55  02-10

## 2024-02-10 NOTE — H&P ADULT - HISTORY OF PRESENT ILLNESS
43 y.o. M with PMHx of hypothyroidism presents to the ED with dyspnea and vomiting. Pt states his symptoms started this morning where he started not feeling well and developed n/v. Denies any new foods. Pt was recently seen in  for outpatient cholecystectomy. Mentions ever since the vomiting he has increasing shortness of breath of exertion and feel like something is stuck in his throat. Otherwise pt denies any chest pain, palpitations, shortness of breath, fever, chills, headache, visual changes, nausea, vomiting, diarrhea, dysuria, frequency. NKDA    In the ED   VSS afebrile , sating well on RA  WBC 20k   CT a/p- Bibasilar opacities, suspicious for pneumonia. Given the distribution,   aspiration should be considered.  s/p zosyn, IVF   43 y.o. M with PMHx of hypothyroidism presents to the ED with back pain. Pt states his symptoms started this morning where he started not feeling well and developed back pain, sharp in nature, radiated to the abdomen. Denies any new foods or doing anything unusual. Pt is s/p myra in [facility] yesterday with Dr. Jay. Otherwise pt denies any chest pain, palpitations, shortness of breath, fever, chills, headache, visual changes, nausea, vomiting, diarrhea, dysuria, frequency. NKDA    In the ED   VSS afebrile , sating well on RA  WBC 20k   CT a/p- Bibasilar opacities, suspicious for pneumonia. Given the distribution,   aspiration should be considered.  s/p zosyn, IVF

## 2024-02-10 NOTE — ED PROVIDER NOTE - CLINICAL SUMMARY MEDICAL DECISION MAKING FREE TEXT BOX
Patient seen immediately after presentation to ER, concern for acute pathology after surgery.  Will CT scan order labs give analgesia.  White count 20 we will give a empiric antibiotics.  Patient's CT showed that he has a large aspiration pneumonia.  Will admit to medicine.

## 2024-02-10 NOTE — ED ADULT NURSE NOTE - NS_SISCREENINGSR_GEN_ALL_ED
06/03/22 1100   Activity/Group Checklist   Group Wellness   Attendance Did not attend   Affect/Mood NITO Negative

## 2024-02-11 LAB
ALBUMIN SERPL ELPH-MCNC: 3.1 G/DL — LOW (ref 3.5–5)
ALP SERPL-CCNC: 43 U/L — SIGNIFICANT CHANGE UP (ref 40–120)
ALT FLD-CCNC: 35 U/L DA — SIGNIFICANT CHANGE UP (ref 10–60)
ANION GAP SERPL CALC-SCNC: 4 MMOL/L — LOW (ref 5–17)
AST SERPL-CCNC: 24 U/L — SIGNIFICANT CHANGE UP (ref 10–40)
BASOPHILS # BLD AUTO: 0.04 K/UL — SIGNIFICANT CHANGE UP (ref 0–0.2)
BASOPHILS NFR BLD AUTO: 0.4 % — SIGNIFICANT CHANGE UP (ref 0–2)
BILIRUB SERPL-MCNC: 0.8 MG/DL — SIGNIFICANT CHANGE UP (ref 0.2–1.2)
BUN SERPL-MCNC: 10 MG/DL — SIGNIFICANT CHANGE UP (ref 7–18)
CALCIUM SERPL-MCNC: 8.7 MG/DL — SIGNIFICANT CHANGE UP (ref 8.4–10.5)
CHLORIDE SERPL-SCNC: 105 MMOL/L — SIGNIFICANT CHANGE UP (ref 96–108)
CO2 SERPL-SCNC: 29 MMOL/L — SIGNIFICANT CHANGE UP (ref 22–31)
CREAT SERPL-MCNC: 1.02 MG/DL — SIGNIFICANT CHANGE UP (ref 0.5–1.3)
CULTURE RESULTS: NO GROWTH — SIGNIFICANT CHANGE UP
EGFR: 92 ML/MIN/1.73M2 — SIGNIFICANT CHANGE UP
EOSINOPHIL # BLD AUTO: 0.2 K/UL — SIGNIFICANT CHANGE UP (ref 0–0.5)
EOSINOPHIL NFR BLD AUTO: 1.8 % — SIGNIFICANT CHANGE UP (ref 0–6)
GLUCOSE SERPL-MCNC: 91 MG/DL — SIGNIFICANT CHANGE UP (ref 70–99)
HCT VFR BLD CALC: 36 % — LOW (ref 39–50)
HGB BLD-MCNC: 11.9 G/DL — LOW (ref 13–17)
IMM GRANULOCYTES NFR BLD AUTO: 0.4 % — SIGNIFICANT CHANGE UP (ref 0–0.9)
LEGIONELLA AG UR QL: NEGATIVE — SIGNIFICANT CHANGE UP
LYMPHOCYTES # BLD AUTO: 2.58 K/UL — SIGNIFICANT CHANGE UP (ref 1–3.3)
LYMPHOCYTES # BLD AUTO: 23.1 % — SIGNIFICANT CHANGE UP (ref 13–44)
MAGNESIUM SERPL-MCNC: 2 MG/DL — SIGNIFICANT CHANGE UP (ref 1.6–2.6)
MCHC RBC-ENTMCNC: 27.7 PG — SIGNIFICANT CHANGE UP (ref 27–34)
MCHC RBC-ENTMCNC: 33.1 GM/DL — SIGNIFICANT CHANGE UP (ref 32–36)
MCV RBC AUTO: 83.9 FL — SIGNIFICANT CHANGE UP (ref 80–100)
MONOCYTES # BLD AUTO: 1.21 K/UL — HIGH (ref 0–0.9)
MONOCYTES NFR BLD AUTO: 10.8 % — SIGNIFICANT CHANGE UP (ref 2–14)
NEUTROPHILS # BLD AUTO: 7.08 K/UL — SIGNIFICANT CHANGE UP (ref 1.8–7.4)
NEUTROPHILS NFR BLD AUTO: 63.5 % — SIGNIFICANT CHANGE UP (ref 43–77)
NRBC # BLD: 0 /100 WBCS — SIGNIFICANT CHANGE UP (ref 0–0)
PHOSPHATE SERPL-MCNC: 3.2 MG/DL — SIGNIFICANT CHANGE UP (ref 2.5–4.5)
PLATELET # BLD AUTO: 236 K/UL — SIGNIFICANT CHANGE UP (ref 150–400)
POTASSIUM SERPL-MCNC: 3.8 MMOL/L — SIGNIFICANT CHANGE UP (ref 3.5–5.3)
POTASSIUM SERPL-SCNC: 3.8 MMOL/L — SIGNIFICANT CHANGE UP (ref 3.5–5.3)
PROT SERPL-MCNC: 6.7 G/DL — SIGNIFICANT CHANGE UP (ref 6–8.3)
RBC # BLD: 4.29 M/UL — SIGNIFICANT CHANGE UP (ref 4.2–5.8)
RBC # FLD: 13.2 % — SIGNIFICANT CHANGE UP (ref 10.3–14.5)
S PNEUM AG UR QL: NEGATIVE — SIGNIFICANT CHANGE UP
SODIUM SERPL-SCNC: 138 MMOL/L — SIGNIFICANT CHANGE UP (ref 135–145)
SPECIMEN SOURCE: SIGNIFICANT CHANGE UP
WBC # BLD: 11.16 K/UL — HIGH (ref 3.8–10.5)
WBC # FLD AUTO: 11.16 K/UL — HIGH (ref 3.8–10.5)

## 2024-02-11 PROCEDURE — 71250 CT THORAX DX C-: CPT | Mod: 26

## 2024-02-11 RX ORDER — PANTOPRAZOLE SODIUM 20 MG/1
40 TABLET, DELAYED RELEASE ORAL
Refills: 0 | Status: DISCONTINUED | OUTPATIENT
Start: 2024-02-11 | End: 2024-02-12

## 2024-02-11 RX ORDER — ALBUTEROL 90 UG/1
2 AEROSOL, METERED ORAL EVERY 6 HOURS
Refills: 0 | Status: DISCONTINUED | OUTPATIENT
Start: 2024-02-11 | End: 2024-02-12

## 2024-02-11 RX ADMIN — Medication 650 MILLIGRAM(S): at 08:24

## 2024-02-11 RX ADMIN — Medication 650 MILLIGRAM(S): at 09:24

## 2024-02-11 RX ADMIN — ENOXAPARIN SODIUM 40 MILLIGRAM(S): 100 INJECTION SUBCUTANEOUS at 05:10

## 2024-02-11 RX ADMIN — Medication 650 MILLIGRAM(S): at 17:44

## 2024-02-11 RX ADMIN — PIPERACILLIN AND TAZOBACTAM 25 GRAM(S): 4; .5 INJECTION, POWDER, LYOPHILIZED, FOR SOLUTION INTRAVENOUS at 21:26

## 2024-02-11 RX ADMIN — Medication 650 MILLIGRAM(S): at 18:44

## 2024-02-11 RX ADMIN — Medication 25 MICROGRAM(S): at 05:09

## 2024-02-11 RX ADMIN — PIPERACILLIN AND TAZOBACTAM 25 GRAM(S): 4; .5 INJECTION, POWDER, LYOPHILIZED, FOR SOLUTION INTRAVENOUS at 05:09

## 2024-02-11 RX ADMIN — PIPERACILLIN AND TAZOBACTAM 25 GRAM(S): 4; .5 INJECTION, POWDER, LYOPHILIZED, FOR SOLUTION INTRAVENOUS at 14:19

## 2024-02-11 NOTE — PROGRESS NOTE ADULT - SUBJECTIVE AND OBJECTIVE BOX
Patient is a 43y old  Male who presents with a chief complaint of Pneumonia (10 Feb 2024 12:59)    pt seen in icu [  ], reg med floor [   ], bed [  ], chair at bedside [   ], a+o x3 [  ], lethargic [  ],  nad [  ]    brito [  ], ngt [  ], peg [  ], et tube [  ], cent line [  ], picc line [  ]        Allergies    No Known Allergies        Vitals    T(F): 98.8 (02-11-24 @ 05:44), Max: 98.8 (02-11-24 @ 05:44)  HR: 94 (02-11-24 @ 05:44) (89 - 95)  BP: 123/75 (02-11-24 @ 05:44) (123/75 - 137/86)  RR: 17 (02-11-24 @ 05:44) (17 - 18)  SpO2: 94% (02-11-24 @ 05:44) (94% - 95%)  Wt(kg): --  CAPILLARY BLOOD GLUCOSE          Labs                          11.9   11.16 )-----------( 236      ( 11 Feb 2024 04:41 )             36.0       02-11    138  |  105  |  10  ----------------------------<  91  3.8   |  29  |  1.02    Ca    8.7      11 Feb 2024 04:41  Phos  3.2     02-11  Mg     2.0     02-11    TPro  6.7  /  Alb  3.1<L>  /  TBili  0.8  /  DBili  x   /  AST  24  /  ALT  35  /  AlkPhos  43  02-11                Radiology Results      Meds    MEDICATIONS  (STANDING):  enoxaparin Injectable 40 milliGRAM(s) SubCutaneous every 24 hours  influenza   Vaccine 0.5 milliLiter(s) IntraMuscular once  lactated ringers. 1000 milliLiter(s) (100 mL/Hr) IV Continuous <Continuous>  levothyroxine 25 MICROGram(s) Oral daily  piperacillin/tazobactam IVPB.. 3.375 Gram(s) IV Intermittent every 8 hours      MEDICATIONS  (PRN):  acetaminophen     Tablet .. 650 milliGRAM(s) Oral every 6 hours PRN Temp greater or equal to 38C (100.4F), Mild Pain (1 - 3)  ondansetron Injectable 4 milliGRAM(s) IV Push every 8 hours PRN Nausea and/or Vomiting      Physical Exam    Neuro :  no focal deficits  Respiratory: CTA B/L  CV: RRR, S1S2, no murmurs,   Abdominal: Soft, NT, ND +BS,  Extremities: No edema, + peripheral pulses    ASSESSMENT    Pneumonitis due to inhalation of food or vomitus    Hypothyroidism    Cholecystitis    S/P LASIK surgery of both eyes        PLAN     Patient is a 43y old  Male who presents with a chief complaint of Pneumonia (10 Feb 2024 12:59)    pt seen in icu [  ], reg med floor [  x ], bed [ x ], chair at bedside [   ], a+o x3 [ x ], lethargic [  ],  nad [ x ]      Allergies    No Known Allergies        Vitals    T(F): 98.8 (02-11-24 @ 05:44), Max: 98.8 (02-11-24 @ 05:44)  HR: 94 (02-11-24 @ 05:44) (89 - 95)  BP: 123/75 (02-11-24 @ 05:44) (123/75 - 137/86)  RR: 17 (02-11-24 @ 05:44) (17 - 18)  SpO2: 94% (02-11-24 @ 05:44) (94% - 95%)  Wt(kg): --  CAPILLARY BLOOD GLUCOSE          Labs                          11.9   11.16 )-----------( 236      ( 11 Feb 2024 04:41 )             36.0       02-11    138  |  105  |  10  ----------------------------<  91  3.8   |  29  |  1.02    Ca    8.7      11 Feb 2024 04:41  Phos  3.2     02-11  Mg     2.0     02-11    TPro  6.7  /  Alb  3.1<L>  /  TBili  0.8  /  DBili  x   /  AST  24  /  ALT  35  /  AlkPhos  43  02-11                Radiology Results      Meds    MEDICATIONS  (STANDING):  enoxaparin Injectable 40 milliGRAM(s) SubCutaneous every 24 hours  influenza   Vaccine 0.5 milliLiter(s) IntraMuscular once  lactated ringers. 1000 milliLiter(s) (100 mL/Hr) IV Continuous <Continuous>  levothyroxine 25 MICROGram(s) Oral daily  piperacillin/tazobactam IVPB.. 3.375 Gram(s) IV Intermittent every 8 hours      MEDICATIONS  (PRN):  acetaminophen     Tablet .. 650 milliGRAM(s) Oral every 6 hours PRN Temp greater or equal to 38C (100.4F), Mild Pain (1 - 3)  ondansetron Injectable 4 milliGRAM(s) IV Push every 8 hours PRN Nausea and/or Vomiting      Physical Exam    Neuro :  no focal deficits  Respiratory: CTA B/L  CV: RRR, S1S2, no murmurs,   Abdominal: Soft, NT, ND +BS, Port sites clean.  Extremities: No edema, + peripheral pulses    ASSESSMENT    bibasilar pna poss 2nd to aspiration   sepsis   alecia   h/o Cholecystitis s/p recent lap myra  Hypothyroidism  S/P LASIK surgery of both eyes        PLAN    cont zosyn   f/u blood cx   id cons   Incentive Spirometry  Bronchodilators  pulm f/u   f/u ct chest   cardio f/u   serum creat wnl   d/c ivf   s/p MARY, laparoscopic cholecystectomy 09-Feb-2024,   attempted IOC - aborted 2/2 narrow cystic duct  surg f/u   gi and dvt prophylaxis   cont current meds

## 2024-02-11 NOTE — PROGRESS NOTE ADULT - SUBJECTIVE AND OBJECTIVE BOX
Patient is a 43y old  Male who presents with a chief complaint of Pneumonia (10 Feb 2024 12:59)    Patient is awake, alert, comfortable in bed in NAD. Afebrile, no cough or SOB.  INTERVAL HPI/OVERNIGHT EVENTS:      VITAL SIGNS:  T(F): 98.8 (02-11-24 @ 05:44)  HR: 94 (02-11-24 @ 05:44)  BP: 123/75 (02-11-24 @ 05:44)  RR: 17 (02-11-24 @ 05:44)  SpO2: 94% (02-11-24 @ 05:44)  Wt(kg): --  I&O's Detail    10 Feb 2024 07:01  -  11 Feb 2024 07:00  --------------------------------------------------------  IN:  Total IN: 0 mL    OUT:    Voided (mL): 200 mL  Total OUT: 200 mL    Total NET: -200 mL      11 Feb 2024 07:01  -  11 Feb 2024 10:41  --------------------------------------------------------  IN:    Oral Fluid: 200 mL  Total IN: 200 mL    OUT:  Total OUT: 0 mL    Total NET: 200 mL              REVIEW OF SYSTEMS:    CONSTITUTIONAL:  No fevers, chills, sweats    HEENT:  Eyes:  No diplopia or blurred vision. ENT:  No earache, sore throat or runny nose.    CARDIOVASCULAR:  No pressure, squeezing, tightness, or heaviness about the chest; no palpitations.    RESPIRATORY:  Per HPI    GASTROINTESTINAL:  No abdominal pain, nausea, vomiting or diarrhea.    GENITOURINARY:  No dysuria, frequency or urgency.    NEUROLOGIC:  No paresthesias, fasciculations, seizures or weakness.    PSYCHIATRIC:  No disorder of thought or mood.      PHYSICAL EXAM:    Constitutional: Well developed and nourished  Eyes:Perrla  ENMT: normal  Neck:supple  Respiratory: good air entry  Cardiovascular: S1 S2 regular  Gastrointestinal: Soft, Non tender  Extremities: No edema  Vascular:normal  Neurological:Awake, alert,Ox3  Musculoskeletal:Normal      MEDICATIONS  (STANDING):  enoxaparin Injectable 40 milliGRAM(s) SubCutaneous every 24 hours  influenza   Vaccine 0.5 milliLiter(s) IntraMuscular once  levothyroxine 25 MICROGram(s) Oral daily  pantoprazole    Tablet 40 milliGRAM(s) Oral before breakfast  piperacillin/tazobactam IVPB.. 3.375 Gram(s) IV Intermittent every 8 hours    MEDICATIONS  (PRN):  acetaminophen     Tablet .. 650 milliGRAM(s) Oral every 6 hours PRN Temp greater or equal to 38C (100.4F), Mild Pain (1 - 3)  albuterol    90 MICROgram(s) HFA Inhaler 2 Puff(s) Inhalation every 6 hours PRN Shortness of Breath and/or Wheezing  ondansetron Injectable 4 milliGRAM(s) IV Push every 8 hours PRN Nausea and/or Vomiting      Allergies    No Known Allergies    Intolerances        LABS:                        11.9   11.16 )-----------( 236      ( 11 Feb 2024 04:41 )             36.0     02-11    138  |  105  |  10  ----------------------------<  91  3.8   |  29  |  1.02    Ca    8.7      11 Feb 2024 04:41  Phos  3.2     02-11  Mg     2.0     02-11    TPro  6.7  /  Alb  3.1<L>  /  TBili  0.8  /  DBili  x   /  AST  24  /  ALT  35  /  AlkPhos  43  02-11    PT/INR - ( 10 Feb 2024 00:20 )   PT: 11.8 sec;   INR: 1.04 ratio         PTT - ( 10 Feb 2024 00:20 )  PTT:30.1 sec  Urinalysis Basic - ( 11 Feb 2024 04:41 )    Color: x / Appearance: x / SG: x / pH: x  Gluc: 91 mg/dL / Ketone: x  / Bili: x / Urobili: x   Blood: x / Protein: x / Nitrite: x   Leuk Esterase: x / RBC: x / WBC x   Sq Epi: x / Non Sq Epi: x / Bacteria: x            CAPILLARY BLOOD GLUCOSE            RADIOLOGY & ADDITIONAL TESTS:  < from: CT Abdomen and Pelvis w/ IV Cont (02.10.24 @ 03:27) >    IMPRESSION:  Bibasilar opacities, suspicious for pneumonia. Given the distribution,   aspiration should be considered.    Postsurgical changes compatible with recent cholecystectomy.    Distended urinary bladder likely related to voluntary holding the   possibility of urinary retention is raised. Correlate clinically.    < end of copied text >    CXR:    Ct scan chest:    ekg;    echo:

## 2024-02-11 NOTE — PROGRESS NOTE ADULT - SUBJECTIVE AND OBJECTIVE BOX
Date of Service 02-11-24 @ 13:06    CHIEF COMPLAINT:Patient is a 43y old  Male who presents with a chief complaint of Pneumonitis .Pt had RT sided chest pain.    	  REVIEW OF SYSTEMS:  CONSTITUTIONAL: No fever, weight loss, or fatigue  EYES: No eye pain, visual disturbances, or discharge  ENT:  No difficulty hearing, tinnitus, vertigo; No sinus or throat pain  NECK: No pain or stiffness  RESPIRATORY: No cough, wheezing, chills or hemoptysis; No Shortness of Breath  CARDIOVASCULAR: + chest pain, No palpitations, passing out, dizziness, or leg swelling  GASTROINTESTINAL: No abdominal or epigastric pain. No nausea, vomiting, or hematemesis; No diarrhea or constipation. No melena or hematochezia.  GENITOURINARY: No dysuria, frequency, hematuria, or incontinence  NEUROLOGICAL: No headaches, memory loss, loss of strength, numbness, or tremors  SKIN: No itching, burning, rashes, or lesions   LYMPH Nodes: No enlarged glands  ENDOCRINE: No heat or cold intolerance; No hair loss  MUSCULOSKELETAL: No joint pain or swelling; No muscle, back, or extremity pain  PSYCHIATRIC: No depression, anxiety, mood swings, or difficulty sleeping  HEME/LYMPH: No easy bruising, or bleeding gums  ALLERGY AND IMMUNOLOGIC: No hives or eczema	      PHYSICAL EXAM:  T(C): 37.1 (02-11-24 @ 05:44), Max: 37.1 (02-11-24 @ 05:44)  HR: 94 (02-11-24 @ 05:44) (89 - 94)  BP: 123/75 (02-11-24 @ 05:44) (123/75 - 137/86)  RR: 17 (02-11-24 @ 05:44) (17 - 18)  SpO2: 94% (02-11-24 @ 05:44) (94% - 95%)  Wt(kg): --  I&O's Summary    10 Feb 2024 07:01  -  11 Feb 2024 07:00  --------------------------------------------------------  IN: 0 mL / OUT: 200 mL / NET: -200 mL    11 Feb 2024 07:01  -  11 Feb 2024 13:06  --------------------------------------------------------  IN: 200 mL / OUT: 0 mL / NET: 200 mL        Appearance: Normal	  HEENT:   Normal oral mucosa, PERRL, EOMI	  Lymphatic: No lymphadenopathy  Cardiovascular: Normal S1 S2, No JVD, No murmurs, No edema  Respiratory: Dec BS at bases  Psychiatry: A & O x 3, Mood & affect appropriate  Gastrointestinal:  Soft, Non-tender, + BS	  Skin: No rashes, No ecchymoses, No cyanosis	  Neurologic: Non-focal  Extremities: Normal range of motion, No clubbing, cyanosis or edema  Vascular: Peripheral pulses palpable 2+ bilaterally    MEDICATIONS  (STANDING):  enoxaparin Injectable 40 milliGRAM(s) SubCutaneous every 24 hours  influenza   Vaccine 0.5 milliLiter(s) IntraMuscular once  levothyroxine 25 MICROGram(s) Oral daily  pantoprazole    Tablet 40 milliGRAM(s) Oral before breakfast  piperacillin/tazobactam IVPB.. 3.375 Gram(s) IV Intermittent every 8 hours      	  	  LABS:	 	    CARDIAC MARKERS:                                11.9   11.16 )-----------( 236      ( 11 Feb 2024 04:41 )             36.0     02-11    138  |  105  |  10  ----------------------------<  91  3.8   |  29  |  1.02    Ca    8.7      11 Feb 2024 04:41  Phos  3.2     02-11  Mg     2.0     02-11    TPro  6.7  /  Alb  3.1<L>  /  TBili  0.8  /  DBili  x   /  AST  24  /  ALT  35  /  AlkPhos  43  02-11    proBNP:   Lipid Profile:   HgA1c:   TSH: Thyroid Stimulating Hormone, Serum: 1.63 uU/mL (02-10 @ 00:20)  Thyroid Stimulating Hormone, Serum: 2.94 uU/mL (02-07 @ 15:15)

## 2024-02-12 ENCOUNTER — TRANSCRIPTION ENCOUNTER (OUTPATIENT)
Age: 44
End: 2024-02-12

## 2024-02-12 ENCOUNTER — RESULT REVIEW (OUTPATIENT)
Age: 44
End: 2024-02-12

## 2024-02-12 VITALS
DIASTOLIC BLOOD PRESSURE: 73 MMHG | HEART RATE: 79 BPM | RESPIRATION RATE: 16 BRPM | SYSTOLIC BLOOD PRESSURE: 118 MMHG | TEMPERATURE: 98 F | OXYGEN SATURATION: 97 %

## 2024-02-12 LAB
A1C WITH ESTIMATED AVERAGE GLUCOSE RESULT: 5.4 % — SIGNIFICANT CHANGE UP (ref 4–5.6)
ALBUMIN SERPL ELPH-MCNC: 2.9 G/DL — LOW (ref 3.5–5)
ALP SERPL-CCNC: 40 U/L — SIGNIFICANT CHANGE UP (ref 40–120)
ALT FLD-CCNC: 28 U/L DA — SIGNIFICANT CHANGE UP (ref 10–60)
ANION GAP SERPL CALC-SCNC: 6 MMOL/L — SIGNIFICANT CHANGE UP (ref 5–17)
AST SERPL-CCNC: 25 U/L — SIGNIFICANT CHANGE UP (ref 10–40)
BASOPHILS # BLD AUTO: 0.05 K/UL — SIGNIFICANT CHANGE UP (ref 0–0.2)
BASOPHILS NFR BLD AUTO: 0.5 % — SIGNIFICANT CHANGE UP (ref 0–2)
BILIRUB SERPL-MCNC: 0.9 MG/DL — SIGNIFICANT CHANGE UP (ref 0.2–1.2)
BUN SERPL-MCNC: 6 MG/DL — LOW (ref 7–18)
CALCIUM SERPL-MCNC: 9 MG/DL — SIGNIFICANT CHANGE UP (ref 8.4–10.5)
CHLORIDE SERPL-SCNC: 106 MMOL/L — SIGNIFICANT CHANGE UP (ref 96–108)
CHOLEST SERPL-MCNC: 138 MG/DL — SIGNIFICANT CHANGE UP
CO2 SERPL-SCNC: 28 MMOL/L — SIGNIFICANT CHANGE UP (ref 22–31)
CREAT SERPL-MCNC: 0.93 MG/DL — SIGNIFICANT CHANGE UP (ref 0.5–1.3)
EGFR: 103 ML/MIN/1.73M2 — SIGNIFICANT CHANGE UP
EOSINOPHIL # BLD AUTO: 0.34 K/UL — SIGNIFICANT CHANGE UP (ref 0–0.5)
EOSINOPHIL NFR BLD AUTO: 3.4 % — SIGNIFICANT CHANGE UP (ref 0–6)
ESTIMATED AVERAGE GLUCOSE: 108 MG/DL — SIGNIFICANT CHANGE UP (ref 68–114)
GLUCOSE SERPL-MCNC: 91 MG/DL — SIGNIFICANT CHANGE UP (ref 70–99)
HCT VFR BLD CALC: 37.6 % — LOW (ref 39–50)
HDLC SERPL-MCNC: 43 MG/DL — SIGNIFICANT CHANGE UP
HGB BLD-MCNC: 12.2 G/DL — LOW (ref 13–17)
IMM GRANULOCYTES NFR BLD AUTO: 0.3 % — SIGNIFICANT CHANGE UP (ref 0–0.9)
LIPID PNL WITH DIRECT LDL SERPL: 82 MG/DL — SIGNIFICANT CHANGE UP
LYMPHOCYTES # BLD AUTO: 2.28 K/UL — SIGNIFICANT CHANGE UP (ref 1–3.3)
LYMPHOCYTES # BLD AUTO: 22.8 % — SIGNIFICANT CHANGE UP (ref 13–44)
M PNEUMO IGM SER-ACNC: 0.26 INDEX — SIGNIFICANT CHANGE UP (ref 0–0.9)
MAGNESIUM SERPL-MCNC: 2.1 MG/DL — SIGNIFICANT CHANGE UP (ref 1.6–2.6)
MCHC RBC-ENTMCNC: 28.1 PG — SIGNIFICANT CHANGE UP (ref 27–34)
MCHC RBC-ENTMCNC: 32.4 GM/DL — SIGNIFICANT CHANGE UP (ref 32–36)
MCV RBC AUTO: 86.6 FL — SIGNIFICANT CHANGE UP (ref 80–100)
MONOCYTES # BLD AUTO: 1.03 K/UL — HIGH (ref 0–0.9)
MONOCYTES NFR BLD AUTO: 10.3 % — SIGNIFICANT CHANGE UP (ref 2–14)
MYCOPLASMA AG SPEC QL: NEGATIVE — SIGNIFICANT CHANGE UP
NEUTROPHILS # BLD AUTO: 6.25 K/UL — SIGNIFICANT CHANGE UP (ref 1.8–7.4)
NEUTROPHILS NFR BLD AUTO: 62.7 % — SIGNIFICANT CHANGE UP (ref 43–77)
NON HDL CHOLESTEROL: 95 MG/DL — SIGNIFICANT CHANGE UP
NRBC # BLD: 0 /100 WBCS — SIGNIFICANT CHANGE UP (ref 0–0)
PHOSPHATE SERPL-MCNC: 3.7 MG/DL — SIGNIFICANT CHANGE UP (ref 2.5–4.5)
PLATELET # BLD AUTO: 242 K/UL — SIGNIFICANT CHANGE UP (ref 150–400)
POTASSIUM SERPL-MCNC: 4.2 MMOL/L — SIGNIFICANT CHANGE UP (ref 3.5–5.3)
POTASSIUM SERPL-SCNC: 4.2 MMOL/L — SIGNIFICANT CHANGE UP (ref 3.5–5.3)
PROT SERPL-MCNC: 6.8 G/DL — SIGNIFICANT CHANGE UP (ref 6–8.3)
RBC # BLD: 4.34 M/UL — SIGNIFICANT CHANGE UP (ref 4.2–5.8)
RBC # FLD: 12.8 % — SIGNIFICANT CHANGE UP (ref 10.3–14.5)
SODIUM SERPL-SCNC: 140 MMOL/L — SIGNIFICANT CHANGE UP (ref 135–145)
TRIGL SERPL-MCNC: 65 MG/DL — SIGNIFICANT CHANGE UP
WBC # BLD: 9.98 K/UL — SIGNIFICANT CHANGE UP (ref 3.8–10.5)
WBC # FLD AUTO: 9.98 K/UL — SIGNIFICANT CHANGE UP (ref 3.8–10.5)

## 2024-02-12 PROCEDURE — 87086 URINE CULTURE/COLONY COUNT: CPT

## 2024-02-12 PROCEDURE — 0225U NFCT DS DNA&RNA 21 SARSCOV2: CPT

## 2024-02-12 PROCEDURE — 86850 RBC ANTIBODY SCREEN: CPT

## 2024-02-12 PROCEDURE — 80053 COMPREHEN METABOLIC PANEL: CPT

## 2024-02-12 PROCEDURE — 84436 ASSAY OF TOTAL THYROXINE: CPT

## 2024-02-12 PROCEDURE — 80061 LIPID PANEL: CPT

## 2024-02-12 PROCEDURE — 85730 THROMBOPLASTIN TIME PARTIAL: CPT

## 2024-02-12 PROCEDURE — 84100 ASSAY OF PHOSPHORUS: CPT

## 2024-02-12 PROCEDURE — 87449 NOS EACH ORGANISM AG IA: CPT

## 2024-02-12 PROCEDURE — 96375 TX/PRO/DX INJ NEW DRUG ADDON: CPT

## 2024-02-12 PROCEDURE — 83036 HEMOGLOBIN GLYCOSYLATED A1C: CPT

## 2024-02-12 PROCEDURE — 83605 ASSAY OF LACTIC ACID: CPT

## 2024-02-12 PROCEDURE — 86900 BLOOD TYPING SEROLOGIC ABO: CPT

## 2024-02-12 PROCEDURE — 81003 URINALYSIS AUTO W/O SCOPE: CPT

## 2024-02-12 PROCEDURE — 96374 THER/PROPH/DIAG INJ IV PUSH: CPT

## 2024-02-12 PROCEDURE — 83735 ASSAY OF MAGNESIUM: CPT

## 2024-02-12 PROCEDURE — 83690 ASSAY OF LIPASE: CPT

## 2024-02-12 PROCEDURE — 36415 COLL VENOUS BLD VENIPUNCTURE: CPT

## 2024-02-12 PROCEDURE — 87899 AGENT NOS ASSAY W/OPTIC: CPT

## 2024-02-12 PROCEDURE — 85610 PROTHROMBIN TIME: CPT

## 2024-02-12 PROCEDURE — 99285 EMERGENCY DEPT VISIT HI MDM: CPT | Mod: 25

## 2024-02-12 PROCEDURE — 93306 TTE W/DOPPLER COMPLETE: CPT

## 2024-02-12 PROCEDURE — 85025 COMPLETE CBC W/AUTO DIFF WBC: CPT

## 2024-02-12 PROCEDURE — 86901 BLOOD TYPING SEROLOGIC RH(D): CPT

## 2024-02-12 PROCEDURE — 84443 ASSAY THYROID STIM HORMONE: CPT

## 2024-02-12 PROCEDURE — 71250 CT THORAX DX C-: CPT

## 2024-02-12 PROCEDURE — 74177 CT ABD & PELVIS W/CONTRAST: CPT | Mod: MA

## 2024-02-12 PROCEDURE — 86738 MYCOPLASMA ANTIBODY: CPT

## 2024-02-12 PROCEDURE — 87040 BLOOD CULTURE FOR BACTERIA: CPT

## 2024-02-12 RX ORDER — PANTOPRAZOLE SODIUM 20 MG/1
1 TABLET, DELAYED RELEASE ORAL
Qty: 30 | Refills: 0
Start: 2024-02-12 | End: 2024-03-12

## 2024-02-12 RX ORDER — ALBUTEROL 90 UG/1
2 AEROSOL, METERED ORAL
Qty: 1 | Refills: 0
Start: 2024-02-12

## 2024-02-12 RX ADMIN — Medication 25 MICROGRAM(S): at 05:19

## 2024-02-12 RX ADMIN — PIPERACILLIN AND TAZOBACTAM 25 GRAM(S): 4; .5 INJECTION, POWDER, LYOPHILIZED, FOR SOLUTION INTRAVENOUS at 05:19

## 2024-02-12 RX ADMIN — ENOXAPARIN SODIUM 40 MILLIGRAM(S): 100 INJECTION SUBCUTANEOUS at 05:19

## 2024-02-12 RX ADMIN — PANTOPRAZOLE SODIUM 40 MILLIGRAM(S): 20 TABLET, DELAYED RELEASE ORAL at 05:19

## 2024-02-12 NOTE — PROGRESS NOTE ADULT - ASSESSMENT
Repeat labs  Plan as per Medicine.
42 y/o male a/w back pain, s/p ambulatory lap myra 2/9  VSS, afebrile, mild leukocytosis resolved, Cr WNL, LFTs / Tbili WNL     postop stable. abd benign    plan   - regular diet  - pain control prn   - IS  - DVT ppx / encourage ambulation   - remainder of care as per medicine  
43 y.o. M with PMHx of hypothyroidism presents to the ED with back pain,chest pain with deep inspiration,s/p lap myra with pneumonia.  1.Pneumonia-abx.  2.CP may be pleuritric due to pneumonia-Echocardiogram.  3.Hypothyroid-synthroid.  4.GI and DVT prophylaxis.  
43 y.o. M with PMHx of hypothyroidism presents to the ED with back pain,chest pain with deep inspiration,s/p lap myra with pneumonia.  1.Pneumonia-abx.  2.CP may be pleuritric due to pneumonia-Echocardiogram.  3.Hypothyroid-synthroid.  4.GI and DVT prophylaxis.  5.Check FLP,A1c.

## 2024-02-12 NOTE — PROGRESS NOTE ADULT - SUBJECTIVE AND OBJECTIVE BOX
Date of Service 02-12-24 @ 11:02    CHIEF COMPLAINT:Patient is a 43y old  Male who presents with a chief complaint of Pneumonia.Pt feeling better.    	  REVIEW OF SYSTEMS:  CONSTITUTIONAL: No fever, weight loss, or fatigue  EYES: No eye pain, visual disturbances, or discharge  ENT:  No difficulty hearing, tinnitus, vertigo; No sinus or throat pain  NECK: No pain or stiffness  RESPIRATORY: No cough, wheezing, chills or hemoptysis; No Shortness of Breath  CARDIOVASCULAR: No chest pain, palpitations, passing out, dizziness, or leg swelling  GASTROINTESTINAL: No abdominal or epigastric pain. No nausea, vomiting, or hematemesis; No diarrhea or constipation. No melena or hematochezia.  GENITOURINARY: No dysuria, frequency, hematuria, or incontinence  NEUROLOGICAL: No headaches, memory loss, loss of strength, numbness, or tremors  SKIN: No itching, burning, rashes, or lesions   LYMPH Nodes: No enlarged glands  ENDOCRINE: No heat or cold intolerance; No hair loss  MUSCULOSKELETAL: No joint pain or swelling; No muscle, back, or extremity pain  PSYCHIATRIC: No depression, anxiety, mood swings, or difficulty sleeping  HEME/LYMPH: No easy bruising, or bleeding gums  ALLERGY AND IMMUNOLOGIC: No hives or eczema	      PHYSICAL EXAM:  T(C): 37.3 (02-12-24 @ 05:03), Max: 37.3 (02-12-24 @ 05:03)  HR: 95 (02-12-24 @ 05:03) (94 - 98)  BP: 129/82 (02-12-24 @ 05:03) (127/79 - 132/76)  RR: 18 (02-12-24 @ 05:03) (18 - 18)  SpO2: 94% (02-12-24 @ 05:03) (94% - 95%)  Wt(kg): --  I&O's Summary    11 Feb 2024 07:01  -  12 Feb 2024 07:00  --------------------------------------------------------  IN: 200 mL / OUT: 0 mL / NET: 200 mL        Appearance: Normal	  HEENT:   Normal oral mucosa, PERRL, EOMI	  Lymphatic: No lymphadenopathy  Cardiovascular: Normal S1 S2, No JVD, No murmurs, No edema  Respiratory: Dec BS at bases  Psychiatry: A & O x 3, Mood & affect appropriate  Gastrointestinal:  Soft, Non-tender, + BS	  Skin: No rashes, No ecchymoses, No cyanosis	  Neurologic: Non-focal  Extremities: Normal range of motion, No clubbing, cyanosis or edema  Vascular: Peripheral pulses palpable 2+ bilaterally    MEDICATIONS  (STANDING):  enoxaparin Injectable 40 milliGRAM(s) SubCutaneous every 24 hours  influenza   Vaccine 0.5 milliLiter(s) IntraMuscular once  levothyroxine 25 MICROGram(s) Oral daily  pantoprazole    Tablet 40 milliGRAM(s) Oral before breakfast  piperacillin/tazobactam IVPB.. 3.375 Gram(s) IV Intermittent every 8 hours    	  	  LABS:	 	                                    12.2   9.98  )-----------( 242      ( 12 Feb 2024 06:22 )             37.6     02-12    140  |  106  |  6<L>  ----------------------------<  91  4.2   |  28  |  0.93    Ca    9.0      12 Feb 2024 06:22  Phos  3.7     02-12  Mg     2.1     02-12    TPro  6.8  /  Alb  2.9<L>  /  TBili  0.9  /  DBili  x   /  AST  25  /  ALT  28  /  AlkPhos  40  02-12    proBNP:   Lipid Profile: Cholesterol 138  LDL --  HDL 43  TG 65  Ldl calc 82  Ratio --    HgA1c:   TSH: Thyroid Stimulating Hormone, Serum: 1.63 uU/mL (02-10 @ 00:20)  Thyroid Stimulating Hormone, Serum: 2.94 uU/mL (02-07 @ 15:15)

## 2024-02-12 NOTE — PROGRESS NOTE ADULT - SUBJECTIVE AND OBJECTIVE BOX
Patient is a 43y old  Male who presents with a chief complaint of s/p lap myra (12 Feb 2024 08:45)  Awake, alert, comfortable in bed in NAD. Feeling better    INTERVAL HPI/OVERNIGHT EVENTS:      VITAL SIGNS:  T(F): 99.1 (02-12-24 @ 05:03)  HR: 95 (02-12-24 @ 05:03)  BP: 129/82 (02-12-24 @ 05:03)  RR: 18 (02-12-24 @ 05:03)  SpO2: 94% (02-12-24 @ 05:03)  Wt(kg): --  I&O's Detail    11 Feb 2024 07:01  -  12 Feb 2024 07:00  --------------------------------------------------------  IN:    Oral Fluid: 200 mL  Total IN: 200 mL    OUT:  Total OUT: 0 mL    Total NET: 200 mL              REVIEW OF SYSTEMS:    CONSTITUTIONAL:  No fevers, chills, sweats    HEENT:  Eyes:  No diplopia or blurred vision. ENT:  No earache, sore throat or runny nose.    CARDIOVASCULAR:  No pressure, squeezing, tightness, or heaviness about the chest; no palpitations.    RESPIRATORY:  Per HPI    GASTROINTESTINAL:  No abdominal pain, nausea, vomiting or diarrhea.    GENITOURINARY:  No dysuria, frequency or urgency.    NEUROLOGIC:  No paresthesias, fasciculations, seizures or weakness.    PSYCHIATRIC:  No disorder of thought or mood.      PHYSICAL EXAM:    Constitutional: Well developed and nourished  Eyes:Perrla  ENMT: normal  Neck:supple  Respiratory: good air entry  Cardiovascular: S1 S2 regular  Gastrointestinal: Soft, Non tender  Extremities: No edema  Vascular:normal  Neurological:Awake, alert,Ox3  Musculoskeletal:Normal      MEDICATIONS  (STANDING):  enoxaparin Injectable 40 milliGRAM(s) SubCutaneous every 24 hours  influenza   Vaccine 0.5 milliLiter(s) IntraMuscular once  levothyroxine 25 MICROGram(s) Oral daily  pantoprazole    Tablet 40 milliGRAM(s) Oral before breakfast  piperacillin/tazobactam IVPB.. 3.375 Gram(s) IV Intermittent every 8 hours    MEDICATIONS  (PRN):  acetaminophen     Tablet .. 650 milliGRAM(s) Oral every 6 hours PRN Temp greater or equal to 38C (100.4F), Mild Pain (1 - 3)  albuterol    90 MICROgram(s) HFA Inhaler 2 Puff(s) Inhalation every 6 hours PRN Shortness of Breath and/or Wheezing  ondansetron Injectable 4 milliGRAM(s) IV Push every 8 hours PRN Nausea and/or Vomiting      Allergies    No Known Allergies    Intolerances        LABS:                        12.2   9.98  )-----------( 242      ( 12 Feb 2024 06:22 )             37.6     02-12    140  |  106  |  6<L>  ----------------------------<  91  4.2   |  28  |  0.93    Ca    9.0      12 Feb 2024 06:22  Phos  3.7     02-12  Mg     2.1     02-12    TPro  6.8  /  Alb  2.9<L>  /  TBili  0.9  /  DBili  x   /  AST  25  /  ALT  28  /  AlkPhos  40  02-12      Urinalysis Basic - ( 12 Feb 2024 06:22 )    Color: x / Appearance: x / SG: x / pH: x  Gluc: 91 mg/dL / Ketone: x  / Bili: x / Urobili: x   Blood: x / Protein: x / Nitrite: x   Leuk Esterase: x / RBC: x / WBC x   Sq Epi: x / Non Sq Epi: x / Bacteria: x            CAPILLARY BLOOD GLUCOSE            RADIOLOGY & ADDITIONAL TESTS:  < from: CT Chest No Cont (02.11.24 @ 14:05) >  IMPRESSION:  Partial atelectasis both lower lobes. Scattered linear opacities.    < end of copied text >    CXR:    Ct scan chest:    ekg;    echo:

## 2024-02-12 NOTE — DISCHARGE NOTE NURSING/CASE MANAGEMENT/SOCIAL WORK - PATIENT PORTAL LINK FT
You can access the FollowMyHealth Patient Portal offered by [facility] by registering at the following website: [email or web address]. By joining [employer]’s FollowMyHealth portal, you will also be able to view your health information using other applications (apps) compatible with our system.

## 2024-02-12 NOTE — DISCHARGE NOTE PROVIDER - CARE PROVIDER_API CALL
Yue Jiménez  Cardiology  [address] [place] [address]  Phone: [phone]  Fax: [phone]  Follow Up Time: 1 week    Debora Alonzo  [address] [place] [id number]  Phone: [phone]  Fax: (   )    -  Follow Up Time:

## 2024-02-12 NOTE — DISCHARGE NOTE PROVIDER - PROVIDER TOKENS
PROVIDER:[TOKEN:[[id number]],FOLLOWUP:[1 week]],FREE:[LAST:[Cheri],FIRST:[Debora],PHONE:[phone]],FAX:[(   )    -],ADDRESS:[[address] [place] [address]]]

## 2024-02-12 NOTE — PROGRESS NOTE ADULT - SUBJECTIVE AND OBJECTIVE BOX
INTERVAL HPI/OVERNIGHT EVENTS:  Pt resting comfortably. No acute complaints. Tolerating regular diet. Ambulating. Denies abd pain. Denies nausea, vomiting, fever, chills.     MEDICATIONS  (STANDING):  enoxaparin Injectable 40 milliGRAM(s) SubCutaneous every 24 hours  influenza   Vaccine 0.5 milliLiter(s) IntraMuscular once  levothyroxine 25 MICROGram(s) Oral daily  pantoprazole    Tablet 40 milliGRAM(s) Oral before breakfast  piperacillin/tazobactam IVPB.. 3.375 Gram(s) IV Intermittent every 8 hours    MEDICATIONS  (PRN):  acetaminophen     Tablet .. 650 milliGRAM(s) Oral every 6 hours PRN Temp greater or equal to 38C (100.4F), Mild Pain (1 - 3)  albuterol    90 MICROgram(s) HFA Inhaler 2 Puff(s) Inhalation every 6 hours PRN Shortness of Breath and/or Wheezing  ondansetron Injectable 4 milliGRAM(s) IV Push every 8 hours PRN Nausea and/or Vomiting      Vital Signs Last 24 Hrs  T(C): 37.3 (12 Feb 2024 05:03), Max: 37.3 (12 Feb 2024 05:03)  T(F): 99.1 (12 Feb 2024 05:03), Max: 99.1 (12 Feb 2024 05:03)  HR: 95 (12 Feb 2024 05:03) (94 - 98)  BP: 129/82 (12 Feb 2024 05:03) (127/79 - 132/76)  BP(mean): 97 (12 Feb 2024 05:03) (97 - 97)  RR: 18 (12 Feb 2024 05:03) (18 - 18)  SpO2: 94% (12 Feb 2024 05:03) (94% - 95%)    Parameters below as of 12 Feb 2024 05:03  Patient On (Oxygen Delivery Method): room air        Physical:  General: A&Ox3. NAD.  Resp: Unlabored breathing. Equal chest rise bilaterally.   Abdomen: Soft nondistended, nontender to palpation diffusely. Steris clean, dry, intact. No voluntary guarding, no rebound tenderness.  Skin: Warm and dry. Nonjaundiced.    I&O's Detail    11 Feb 2024 07:01  -  12 Feb 2024 07:00  --------------------------------------------------------  IN:    Oral Fluid: 200 mL  Total IN: 200 mL    OUT:  Total OUT: 0 mL    Total NET: 200 mL          LABS:                        12.2   9.98  )-----------( 242      ( 12 Feb 2024 06:22 )             37.6             02-12    140  |  106  |  6<L>  ----------------------------<  91  4.2   |  28  |  0.93    Ca    9.0      12 Feb 2024 06:22  Phos  3.7     02-12  Mg     2.1     02-12    TPro  6.8  /  Alb  2.9<L>  /  TBili  0.9  /  DBili  x   /  AST  25  /  ALT  28  /  AlkPhos  40  02-12

## 2024-02-12 NOTE — DISCHARGE NOTE PROVIDER - NSDCMRMEDTOKEN_GEN_ALL_CORE_FT
levothyroxine 25 mcg (0.025 mg) oral tablet: 1 tab(s) orally once a day   albuterol 90 mcg/inh inhalation aerosol: 2 puff(s) inhaled every 6 hours as needed for Shortness of Breath and/or Wheezing  amoxicillin-clavulanate 875 mg-125 mg oral tablet: 875 milligram(s) orally 2 times a day  levothyroxine 25 mcg (0.025 mg) oral tablet: 1 tab(s) orally once a day  pantoprazole 40 mg oral delayed release tablet: 1 tab(s) orally once a day (before a meal)

## 2024-02-12 NOTE — PROGRESS NOTE ADULT - SUBJECTIVE AND OBJECTIVE BOX
Patient is a 43y old  Male who presents with a chief complaint of Pneumonia (11 Feb 2024 13:06)    pt seen in icu [  ], reg med floor [  x ], bed [ x ], chair at bedside [   ], a+o x3 [ x ], lethargic [  ],    nad [ x ]        Allergies    No Known Allergies        Vitals    T(F): 99.1 (02-12-24 @ 05:03), Max: 99.1 (02-12-24 @ 05:03)  HR: 95 (02-12-24 @ 05:03) (94 - 98)  BP: 129/82 (02-12-24 @ 05:03) (127/79 - 132/76)  RR: 18 (02-12-24 @ 05:03) (18 - 18)  SpO2: 94% (02-12-24 @ 05:03) (94% - 95%)  Wt(kg): --  CAPILLARY BLOOD GLUCOSE          Labs                          11.9   11.16 )-----------( 236      ( 11 Feb 2024 04:41 )             36.0       02-11    138  |  105  |  10  ----------------------------<  91  3.8   |  29  |  1.02    Ca    8.7      11 Feb 2024 04:41  Phos  3.2     02-11  Mg     2.0     02-11    TPro  6.7  /  Alb  3.1<L>  /  TBili  0.8  /  DBili  x   /  AST  24  /  ALT  35  /  AlkPhos  43  02-11            Clean Catch Clean Catch (Midstream)  02-10 @ 14:04   No growth  --  --      .Blood Blood-Peripheral  02-10 @ 09:20   No growth at 24 hours  --  --      .Blood Blood-Peripheral  02-10 @ 09:16   No growth at 24 hours  --  --          Radiology Results      Meds    MEDICATIONS  (STANDING):  enoxaparin Injectable 40 milliGRAM(s) SubCutaneous every 24 hours  influenza   Vaccine 0.5 milliLiter(s) IntraMuscular once  levothyroxine 25 MICROGram(s) Oral daily  pantoprazole    Tablet 40 milliGRAM(s) Oral before breakfast  piperacillin/tazobactam IVPB.. 3.375 Gram(s) IV Intermittent every 8 hours      MEDICATIONS  (PRN):  acetaminophen     Tablet .. 650 milliGRAM(s) Oral every 6 hours PRN Temp greater or equal to 38C (100.4F), Mild Pain (1 - 3)  albuterol    90 MICROgram(s) HFA Inhaler 2 Puff(s) Inhalation every 6 hours PRN Shortness of Breath and/or Wheezing  ondansetron Injectable 4 milliGRAM(s) IV Push every 8 hours PRN Nausea and/or Vomiting      Physical Exam    Neuro :  no focal deficits  Respiratory: CTA B/L  CV: RRR, S1S2, no murmurs,   Abdominal: Soft, NT, ND +BS, Port sites clean.  Extremities: No edema, + peripheral pulses    ASSESSMENT    bibasilar pna poss 2nd to aspiration   sepsis   alecia   h/o Cholecystitis s/p recent lap myra  Hypothyroidism  S/P LASIK surgery of both eyes        PLAN    cont zosyn   f/u blood cx   id cons   Incentive Spirometry  Bronchodilators  pulm f/u   f/u ct chest   cardio f/u   serum creat wnl   d/c ivf   s/p MARY, laparoscopic cholecystectomy 09-Feb-2024,   attempted IOC - aborted 2/2 narrow cystic duct  surg f/u   gi and dvt prophylaxis   cont current meds          Patient is a 43y old  Male who presents with a chief complaint of Pneumonia (11 Feb 2024 13:06)    pt seen in icu [  ], reg med floor [  x ], bed [ x ], chair at bedside [   ], a+o x3 [ x ], lethargic [  ],    nad [ x ]        Allergies    No Known Allergies        Vitals    T(F): 99.1 (02-12-24 @ 05:03), Max: 99.1 (02-12-24 @ 05:03)  HR: 95 (02-12-24 @ 05:03) (94 - 98)  BP: 129/82 (02-12-24 @ 05:03) (127/79 - 132/76)  RR: 18 (02-12-24 @ 05:03) (18 - 18)  SpO2: 94% (02-12-24 @ 05:03) (94% - 95%)  Wt(kg): --  CAPILLARY BLOOD GLUCOSE          Labs                          11.9   11.16 )-----------( 236      ( 11 Feb 2024 04:41 )             36.0       02-11    138  |  105  |  10  ----------------------------<  91  3.8   |  29  |  1.02    Ca    8.7      11 Feb 2024 04:41  Phos  3.2     02-11  Mg     2.0     02-11    TPro  6.7  /  Alb  3.1<L>  /  TBili  0.8  /  DBili  x   /  AST  24  /  ALT  35  /  AlkPhos  43  02-11            Clean Catch Clean Catch (Midstream)  02-10 @ 14:04   No growth  --  --      .Blood Blood-Peripheral  02-10 @ 09:20   No growth at 24 hours  --  --      .Blood Blood-Peripheral  02-10 @ 09:16   No growth at 24 hours  --  --          Radiology Results    < from: CT Chest No Cont (02.11.24 @ 14:05) >  FINDINGS:    AIRWAYS AND LUNGS: The central tracheobronchial tree is patent.  Partial   atelectasis both lower lobes. Scattered linear opacities.    MEDIASTINUM AND PLEURA: There are no enlarged mediastinal, hilar or   axillary lymph nodes. The visualized portion of the thyroid gland is   unremarkable. There is no pleural effusion. There is no pneumothorax.    HEART AND VESSELS: There is mild cardiomegaly.  There are no   atherosclerotic calcifications of the aorta.  There is no pericardial   effusion.    UPPER ABDOMEN: Images of the upper abdomen demonstrate cholecystectomy.   Pneumoperitoneum. Ascites and fat stranding.    BONES AND SOFT TISSUES: The bones are unremarkable.  The soft tissues are   unremarkable.      IMPRESSION:  Partial atelectasis both lower lobes. Scattered linear opacities.    < end of copied text >        Meds    MEDICATIONS  (STANDING):  enoxaparin Injectable 40 milliGRAM(s) SubCutaneous every 24 hours  influenza   Vaccine 0.5 milliLiter(s) IntraMuscular once  levothyroxine 25 MICROGram(s) Oral daily  pantoprazole    Tablet 40 milliGRAM(s) Oral before breakfast  piperacillin/tazobactam IVPB.. 3.375 Gram(s) IV Intermittent every 8 hours      MEDICATIONS  (PRN):  acetaminophen     Tablet .. 650 milliGRAM(s) Oral every 6 hours PRN Temp greater or equal to 38C (100.4F), Mild Pain (1 - 3)  albuterol    90 MICROgram(s) HFA Inhaler 2 Puff(s) Inhalation every 6 hours PRN Shortness of Breath and/or Wheezing  ondansetron Injectable 4 milliGRAM(s) IV Push every 8 hours PRN Nausea and/or Vomiting      Physical Exam    Neuro :  no focal deficits  Respiratory: CTA B/L  CV: RRR, S1S2, no murmurs,   Abdominal: Soft, NT, ND +BS, Port sites clean.  Extremities: No edema, + peripheral pulses    ASSESSMENT    bibasilar pna poss 2nd to aspiration   sepsis   atelectasis   alecia   h/o Cholecystitis s/p recent lap myra  Hypothyroidism  S/P LASIK surgery of both eyes        PLAN    cont zosyn   blood and ucx neg noted above   ct chest with Partial atelectasis both lower lobes. Scattered linear opacities noted above     change abx to augmentin 875 mg q 12 until 2/16/24   cont Incentive Spirometry  Bronchodilators  pulm f/u   cardio f/u   f/u echo   serum creat wnl   s/p MARY, laparoscopic cholecystectomy 09-Feb-2024,   attempted IOC - aborted 2/2 narrow cystic duct  surg f/u   gi and dvt prophylaxis   cont current meds   d/c plan pend echo

## 2024-02-12 NOTE — DISCHARGE NOTE PROVIDER - HOSPITAL COURSE
43 y.o. M with PMHx of hypothyroidism presents to the ED with back pain and chest pain with inspiration. [facility] for acute cholecystitis.    In the ED   VSS afebrile , sating well on RA  WBC 20k   CT a/p- Bibasilar opacities, suspicious for pneumonia. Given the distribution,   aspiration should be considered.  s/p zosyn, IVF    Pt admitted for sepsis 2/2 aspiration PNA. Pulm and Cardio consulted.  pt c/w zosyn abx. blood and ucx neg   on d/c change abx to Augmentin 875 mg q 12 until 2/16/24   cont Incentive Spirometry  Bronchodilators.    Cardio recc Echo, since on admission pt p/w chest pain with deep inspiration. Echo showed ??????????    Pt also followed by Sx, with no acute intervention abd benign and pt tolerating diet.       Case discussed with attending, pt medically stable for d/c Please note that this a brief summary of hospital course please refer to daily progress notes and consult notes for full course and events 43 y.o. M with PMHx of hypothyroidism presents to the ED with back pain and chest pain with inspiration. Pt is s/p myra in [facility] with Dr. Jay on 2/9/24 for acute cholecystitis.    In the ED   VSS afebrile , sating well on RA  WBC 20k   CT a/p- Bibasilar opacities, suspicious for pneumonia. Given the distribution,   aspiration should be considered.  s/p zosyn, IVF    Pt admitted for sepsis 2/2 aspiration PNA. Pulm and Cardio consulted.  pt c/w zosyn abx. blood and ucx neg   on d/c change abx to Augmentin 875 mg q 12 until 2/16/24   cont Incentive Spirometry  Bronchodilators.   on admission pt p/w chest pain with deep inspiration which is likely pleuritic due to pneumonia-  Outpatient Echo recommended    Pt also followed by Sx, with no acute intervention abd benign and pt tolerating diet.       Case discussed with attending, pt medically stable for d/c Please note that this a brief summary of hospital course please refer to daily progress notes and consult notes for full course and events

## 2024-02-12 NOTE — DISCHARGE NOTE PROVIDER - NSDCCPCAREPLAN_GEN_ALL_CORE_FT
PRINCIPAL DISCHARGE DIAGNOSIS  Diagnosis: Sepsis  Assessment and Plan of Treatment: You presented to the ed with complaints of back pain and chest pain. You were found to have an elevated white blood cell count and heart rate. You were diagnosed with sepsis. Sepsis is the body's extreme response to an infection. You had a ct scan of your chest which showed pneumonia. You were treated with IV antibiotics and your condition improved. Take all antibiotics as ordered.  Call you Health care provider upon arrival home to make a one week follow up appointment.  If you develop fever, chills, malaise, or change in mental status call your Health Care Provider or go to the Emergency Department.  Nutrition is important, eat small frequent meals to help ensure you get adequate calories.  Do not stay in bed all day!  Increase your activity daily as tolerated.        SECONDARY DISCHARGE DIAGNOSES  Diagnosis: Pneumonia, aspiration  Assessment and Plan of Treatment: Pneumonia is a lung infection that can cause a fever, cough, and trouble breathing.Continue all antibiotics as ordered until complete. (Augmentin until 2/16/24)  Nutrition is important, eat small frequent meals.  Get lots of rest and drink fluids.  Call your health care provider upon arrival home from hospital and make a follow up appointment for one week.  If your cough worsens, you develop fever greater than 101', you have shaking chills, a fast heartbeat, trouble breathing and/or feel your are breathing much faster than usual, call your healthcare provider.  Make sure you wash your hands frequently.      Diagnosis: S/P cholecystectomy  Assessment and Plan of Treatment: You were followed by the surgery team while inpatient. You had a lap choley on 2/9/24. Your abdominal exam remains normal and you are tolerating a regular diet. Call your doctor or surgeon if:  You have pain or nausea that is not relieved by medicine.  You have redness and swelling around your incision sites.  You have blood or pus leaking from your incision sites.  You are constipated, have diarrhea, or your bowel movements are pale. Your skin or eyes are yellow.  Care for the surgery area:  Keep the area clean and dry. Check for signs of infection each day.   What to eat after surgery:  Eat low-fat foods for 4 to 6 weeks while your body learns to digest fat without a gallbladder. Slowly increase the amount of fat that you eat.  Rest often and slowly increase your activity level each day..  Do not lift anything heavier than 10 pounds for 4 to 6 weeks, or as directed.  Follow with your provider.    Diagnosis: Hypothyroidism  Assessment and Plan of Treatment: you do not make enough thyroid hormone  signs & symptoms of low levels of thyroid hormone - tired, getting cold easily, coarse or thin hair, constipation, shortness of breath, swelling, irregular periods  your doctor will do thyroid hormone blood tests at least once a year to monitor if medication dose is adequate  take your thyroid medicine as directed by your doctor & on empty stomach       PRINCIPAL DISCHARGE DIAGNOSIS  Diagnosis: Sepsis  Assessment and Plan of Treatment: You presented to the ed with complaints of back pain and chest pain. You were found to have an elevated white blood cell count and heart rate. You were diagnosed with sepsis. Sepsis is the body's extreme response to an infection. You had a ct scan of your chest which showed pneumonia. You were treated with IV antibiotics and your condition improved. Take all antibiotics as ordered.  Call you Health care provider upon arrival home to make a one week follow up appointment.  If you develop fever, chills, malaise, or change in mental status call your Health Care Provider or go to the Emergency Department.  Nutrition is important, eat small frequent meals to help ensure you get adequate calories.  Do not stay in bed all day!  Increase your activity daily as tolerated.        SECONDARY DISCHARGE DIAGNOSES  Diagnosis: S/P cholecystectomy  Assessment and Plan of Treatment: You were followed by the surgery team while inpatient. You had a lap choley on 2/9/24. Your abdominal exam remains normal and you are tolerating a regular diet. Call your doctor or surgeon if:  You have pain or nausea that is not relieved by medicine.  You have redness and swelling around your incision sites.  You have blood or pus leaking from your incision sites.  You are constipated, have diarrhea, or your bowel movements are pale. Your skin or eyes are yellow.  Care for the surgery area:  Keep the area clean and dry. Check for signs of infection each day.   What to eat after surgery:  Eat low-fat foods for 4 to 6 weeks while your body learns to digest fat without a gallbladder. Slowly increase the amount of fat that you eat.  Rest often and slowly increase your activity level each day..  Do not lift anything heavier than 10 pounds for 4 to 6 weeks, or as directed.  Follow with your provider.    Diagnosis: Pneumonia, aspiration  Assessment and Plan of Treatment: Pneumonia is a lung infection that can cause a fever, cough, and trouble breathing.Continue all antibiotics as ordered until complete. (Augmentin until 2/16/24)  Nutrition is important, eat small frequent meals.  Get lots of rest and drink fluids.  Call your health care provider upon arrival home from hospital and make a follow up appointment for one week.  If your cough worsens, you develop fever greater than 101', you have shaking chills, a fast heartbeat, trouble breathing and/or feel your are breathing much faster than usual, call your healthcare provider.  Make sure you wash your hands frequently.      Diagnosis: Chest pain  Assessment and Plan of Treatment: You complained of chest pain which you say now has resolved. This was likely pleuritic chest pain caused by Pneumonia. You were seen by Cardiology who recommended that you have an outpatient echocardiogram.    Diagnosis: Hypothyroidism  Assessment and Plan of Treatment: you do not make enough thyroid hormone  signs & symptoms of low levels of thyroid hormone - tired, getting cold easily, coarse or thin hair, constipation, shortness of breath, swelling, irregular periods  your doctor will do thyroid hormone blood tests at least once a year to monitor if medication dose is adequate  take your thyroid medicine as directed by your doctor & on empty stomach

## 2024-02-13 PROBLEM — E03.9 HYPOTHYROIDISM, UNSPECIFIED: Chronic | Status: ACTIVE | Noted: 2024-02-07

## 2024-02-13 PROBLEM — K81.9 CHOLECYSTITIS, UNSPECIFIED: Chronic | Status: ACTIVE | Noted: 2024-02-07

## 2024-02-15 LAB
CULTURE RESULTS: SIGNIFICANT CHANGE UP
CULTURE RESULTS: SIGNIFICANT CHANGE UP
SPECIMEN SOURCE: SIGNIFICANT CHANGE UP
SPECIMEN SOURCE: SIGNIFICANT CHANGE UP
SURGICAL PATHOLOGY STUDY: SIGNIFICANT CHANGE UP

## 2024-02-20 PROBLEM — K81.9 CHOLECYSTITIS: Status: ACTIVE | Noted: 2024-02-05

## 2024-02-20 NOTE — DATA REVIEWED
[FreeTextEntry1] : 	 Kay Accession Number : [id number] Patient:     JOSE ENRIQUE LIGHT   Accession:                             [id number]  Collected Date/Time:                   2/9/2024 11:24 EST Received Date/Time:                    2/12/2024 08:00 EST  Surgical Pathology Report - Auth (Verified)  Specimen(s) Submitted 1  Gallbladder  Final Diagnosis Gallbladder, cholecystectomy: -   Acute cholecystitis with mucosal sloughing and ulceration, superimposed on chronic cholecystitis with granulation and foreign body reaction.  Verified by: Jessica Gary M.D. (Electronic Signature) Reported on: 02/15/24 12:02 EST, [facility], [address] [place] [address] _________________________________________________________________  Comment  Specimen received disrupted. No calculi are identified in lumen or container.

## 2024-02-22 ENCOUNTER — APPOINTMENT (OUTPATIENT)
Dept: SURGERY | Facility: CLINIC | Age: 44
End: 2024-02-22
Payer: COMMERCIAL

## 2024-02-22 DIAGNOSIS — K81.9 CHOLECYSTITIS, UNSPECIFIED: ICD-10-CM

## 2024-02-22 PROCEDURE — 99024 POSTOP FOLLOW-UP VISIT: CPT

## 2024-06-09 NOTE — PROGRESS NOTE ADULT - PROBLEM SELECTOR PLAN 1
Check pending cultures  Antibiotics  CT chest without contrast noted  Atelectasis on bases  Incentive Spirometry  Bronchodilators  Chest PT  Monitor Temp and WBC
Check pending cultures  Antibiotics  CT chest without contrast  Incentive Spirometry  Bronchodilators  Chest PT  Monitor Temp and WBC
Benefits, risks, and possible complications of procedure explained to patient/caregiver who verbalized understanding and gave written consent.

## (undated) DEVICE — DRAPE C ARM UNIVERSAL

## (undated) DEVICE — SYR LUER LOK 10CC

## (undated) DEVICE — ELCTR BOVIE BLADE 3/4" EXTENDED LENGTH 6"

## (undated) DEVICE — SUT POLYSORB 4-0 27" P-12 UNDYED

## (undated) DEVICE — DRAPE LIGHT HANDLE COVER (BLUE)

## (undated) DEVICE — SUT POLYSORB 0 30" GU-46

## (undated) DEVICE — NDL HYPO REGULAR BEVEL 25G X 1.5" (BLUE)

## (undated) DEVICE — DRSG 2X2

## (undated) DEVICE — D HELP - CLEARVIEW CLEARIFY SYSTEM

## (undated) DEVICE — TUBING STRYKEFLOW II SUCTION / IRRIGATOR

## (undated) DEVICE — GLV 7.5 PROTEXIS (WHITE)

## (undated) DEVICE — VENODYNE/SCD SLEEVE CALF MEDIUM

## (undated) DEVICE — BLADE SURGICAL #10 CARBON

## (undated) DEVICE — SOL INJ NS 0.9% 1000ML

## (undated) DEVICE — SOL IRR POUR NS 0.9% 1500ML

## (undated) DEVICE — TUBING STRYKER PNEUMOSURE HEATED RTP

## (undated) DEVICE — DRSG TEGADERM 2.5X3"

## (undated) DEVICE — DRSG STERISTRIPS 0.5 X 4"

## (undated) DEVICE — DRSG MEDIPORE DRESS IT 5-7/8 X 11"

## (undated) DEVICE — WARMING BLANKET UPPER ADULT

## (undated) DEVICE — TROCAR COVIDIEN VERSAONE BLADED SMOOTH 5MM STANDARD

## (undated) DEVICE — PRECISION CUT SCISSOR MICROLINE MINI ENDOCUT DISP

## (undated) DEVICE — STAPLER SKIN VISI-STAT 35 WIDE

## (undated) DEVICE — DRAIN JACKSON PRATT 10MM FLAT 3/4 NO TROCAR

## (undated) DEVICE — SUCTION YANKAUER NO CONTROL VENT

## (undated) DEVICE — DRSG BANDAID 0.75X3"

## (undated) DEVICE — TUBING TRUWAVE PRESSURE MALE/FEMALE 72"

## (undated) DEVICE — INSUFFLATION NDL COVIDIEN SURGINEEDLE VERESS 120MM

## (undated) DEVICE — BLADE SURGICAL #15 CARBON

## (undated) DEVICE — DRAIN RESERVOIR FOR JACKSON PRATT 100CC CARDINAL

## (undated) DEVICE — BASIN SET SINGLE

## (undated) DEVICE — FOR-ESU VALLEYLAB T7E14982DX: Type: DURABLE MEDICAL EQUIPMENT

## (undated) DEVICE — SUT MAXON 1 60" T-60

## (undated) DEVICE — DRSG MASTISOL

## (undated) DEVICE — ELCTR GROUNDING PAD ADULT COVIDIEN

## (undated) DEVICE — GLV 7 PROTEXIS (WHITE)

## (undated) DEVICE — ELCTR FOOT CONTROL L WIRE LAPAROSCOPIC

## (undated) DEVICE — ENDOCATCH 10MM SPECIMEN POUCH

## (undated) DEVICE — PACK GENERAL LAPAROSCOPY

## (undated) DEVICE — SYR ASEPTO

## (undated) DEVICE — TROCAR COVIDIEN VERSAONE BLADED SMOOTH 11MM STANDARD